# Patient Record
Sex: FEMALE | Race: WHITE | Employment: FULL TIME | URBAN - METROPOLITAN AREA
[De-identification: names, ages, dates, MRNs, and addresses within clinical notes are randomized per-mention and may not be internally consistent; named-entity substitution may affect disease eponyms.]

---

## 2017-12-05 ENCOUNTER — TELEPHONE (OUTPATIENT)
Dept: SURGERY | Age: 37
End: 2017-12-05

## 2017-12-07 ENCOUNTER — OFFICE VISIT (OUTPATIENT)
Dept: SURGERY | Age: 37
End: 2017-12-07

## 2017-12-07 ENCOUNTER — DOCUMENTATION ONLY (OUTPATIENT)
Dept: SURGERY | Age: 37
End: 2017-12-07

## 2017-12-07 VITALS
HEIGHT: 67 IN | WEIGHT: 114 LBS | HEART RATE: 64 BPM | DIASTOLIC BLOOD PRESSURE: 76 MMHG | RESPIRATION RATE: 20 BRPM | BODY MASS INDEX: 17.89 KG/M2 | TEMPERATURE: 98.1 F | SYSTOLIC BLOOD PRESSURE: 112 MMHG

## 2017-12-07 DIAGNOSIS — E04.1 THYROID NODULE: Primary | ICD-10-CM

## 2017-12-07 NOTE — LETTER
12/11/2017 4:05 PM 
 
Patient:  Ivana Kumar YOB: 1980 Date of Visit: 12/7/2017 Angie Buckley MD 
[address] [facility] [address] [address] VIA In Basket Nisha Hernandez MD 
[address] [facility] [phone] [id number] VIA Facsimile: [phone] Lisa Correa MD 
[facility] [address] [id number] VIA Facsimile: [phone] Dear MD Nisha Vazquez MD Sherrin Mulders, MD, Thank you for [facility] and [facility]. Below are the relevant portions of my assessment and plan of care. Hernia Evaluation Subjective:  
 
Thomas Flynn is a 44 y.o. female with a history of open trocar site hernia repair by me in 2013. She complains of new epigastric and generalized discomfort for the last year. She has had no nausea and no vomiting. The discomfort is dull and deep but seems to be related to new bloating. In addition, she had a finding of a new relatively asymptomatic thyroid nodule. She has had no work-up of this. She also has a new 5.5 cm ovarian cyst with elevated Ca-125. She is to see Dr. Calvin Zavala first for this cyst next week. There are no active problems to display for this patient. Past Medical History:  
Diagnosis Date  Eczema  Endometriosis  Moderate precancerous changes of the cervix  Nausea & vomiting   
 severe nausea  Polycystic disease, ovaries  Unspecified adverse effect of anesthesia   
 post op pain, severe Past Surgical History:  
Procedure Laterality Date  BREAST SURGERY PROCEDURE UNLISTED  2010 Breast bx left breast  
 HX BREAST LUMPECTOMY  2010  HX GYN Robotic BSO, myomectomy  HX LAPAROTOMY  HX OTHER SURGICAL  2004 Isolated Lymphadenectomy of neck for benign enlargement Social History Substance Use Topics  Smoking status: Never Smoker  Smokeless tobacco: Never Used  Alcohol use Yes Comment: occ Family History Problem Relation Age of Onset  Cancer Mother Breast CA  Post-op Nausea/Vomiting Mother  Cancer Maternal Aunt Breast CA  Cancer Maternal Grandmother Breast CA  
 Osteoporosis Father  Lung Disease Father  COPD Father No current outpatient prescriptions on file. No current facility-administered medications for this visit. Allergies Allergen Reactions  Latex Other (comments) Irritates skin Review of Systems:  Positive in BOLD 
 
CONST: Fever, weight loss, fatigue or chills GI: Nausea, vomiting, abdominal pain, change in bowel habits, hematochezia, melena, and GERD, bloating INTEG: Dermatitis, abnormal moles HEENT: Recent changes in vision, vertigo, epistaxis, dysphagia and hoarseness, globus CV: Chest pain, palpitations, HTN, edema and varicosities RESP: Cough, shortness of breath, wheezing, hemoptysis, snoring and reactive airway disease : Hematuria, dysuria, frequency, urgency, nocturia and stress urinary incontinence MS: Weakness, joint pain and arthritis ENDO: Diabetes, thyroid disease, polyuria, polydipsia, polyphagia, poor wound healing, heat intolerance, cold intolerance LYMPH/HEME: Anemia, bruising and history of blood transfusions NEURO: Dizziness, headache, fainting, seizures and stroke PSYCH: Anxiety and depression Objective:  
 
Visit Vitals  /76 (BP 1 Location: Left arm, BP Patient Position: At rest)  Pulse 64  Temp 98.1 °F (36.7 °C) (Oral)  Resp 20  
 Ht 5' 7\" (1.702 m)  Wt 51.7 kg (114 lb)  BMI 17.85 kg/m2 Physical Exam:   
 
GENERAL: alert, cooperative, no distress, appears stated age EYE:conjunctivae and sclerae normal, pupils equal, round, reactive to light, extraocular movements intact without nystagmus THROAT & NECK: 3 cm left thyroid nodule, mobile but mildly tender to exam 
LUNG: clear to auscultation bilaterally HEART: Regular rate and rhythm ABDOMEN: Well healed epigastric hernia repair. Edges of the mesh are palpable due to habitus. There is no recurrent hernia nor evidence of complication EXTREMITIES:  extremities normal, atraumatic, no cyanosis or edema SKIN: Normal. 
 
Imaging and Lab Review: none Assessment: 1. No hernia recurrence 2. Thyroid nodule - Patient has significant symptoms and wishes to proceed with surgical intervention. Plan: 1. Reassured re: hernia site 2. Obtain TFTs today and thyroid US 3. Assuming euthyroid and single nodule will obtain FNA in radiology and return after. Signed By: Rebekah Starr MD   
 December 7, 2017 Thank you very much for your referral of Ms. Valerie Alvarez. If you have questions, please do not hesitate to call me. I look forward to following Ms. Figueredo along with you and will keep you updated as to her progress.   
 
 
 
 
Sincerely, 
 
 
Rebekah Starr MD

## 2017-12-07 NOTE — PROGRESS NOTES
Bee Swanson is a 44 y.o. female who presents today with   Chief Complaint   Patient presents with    Thyroid Problem     Pt presents today to f/u of hernia repair 2013 and thyroid nodule. 1. Have you been to the ER, urgent care clinic since your last visit? Hospitalized since your last visit? No    2. Have you seen or consulted any other health care providers outside of the [address] [place] [address] since your last visit? Include any pap smears or colon screening.  No

## 2017-12-07 NOTE — PROGRESS NOTES
Hernia Evaluation      Subjective:     Aletha Ferreira is a 44 y.o. female with a history of open trocar site hernia repair by me in 2013. She complains of new epigastric and generalized discomfort for the last year. She has had no nausea and no vomiting. The discomfort is dull and deep but seems to be related to new bloating. In addition, she had a finding of a new relatively asymptomatic thyroid nodule. She has had no work-up of this. She also has a new 5.5 cm ovarian cyst with elevated Ca-125. She is to see Dr. Epifanio Parkinson first for this cyst next week. There are no active problems to display for this patient. Past Medical History:   Diagnosis Date    Eczema     Endometriosis     Moderate precancerous changes of the cervix     Nausea & vomiting     severe nausea    Polycystic disease, ovaries     Unspecified adverse effect of anesthesia     post op pain, severe      Past Surgical History:   Procedure Laterality Date    BREAST SURGERY PROCEDURE UNLISTED  2010    Breast bx left breast    HX BREAST LUMPECTOMY  2010    HX GYN      Robotic BSO, myomectomy    HX LAPAROTOMY      HX OTHER SURGICAL  2004    Isolated Lymphadenectomy of neck for benign enlargement      Social History   Substance Use Topics    Smoking status: Never Smoker    Smokeless tobacco: Never Used    Alcohol use Yes      Comment: occ      Family History   Problem Relation Age of Onset    Cancer Mother      Breast CA    Post-op Nausea/Vomiting Mother     Cancer Maternal Aunt      Breast CA    Cancer Maternal Grandmother      Breast CA    Osteoporosis Father     Lung Disease Father     COPD Father       No current outpatient prescriptions on file. No current facility-administered medications for this visit.        Allergies   Allergen Reactions    Latex Other (comments)     Irritates skin        Review of Systems:  Positive in BOLD    CONST: Fever, weight loss, fatigue or chills  GI: Nausea, vomiting, abdominal pain, change in bowel habits, hematochezia, melena, and GERD, bloating   INTEG: Dermatitis, abnormal moles  HEENT: Recent changes in vision, vertigo, epistaxis, dysphagia and hoarseness, globus  CV: Chest pain, palpitations, HTN, edema and varicosities  RESP: Cough, shortness of breath, wheezing, hemoptysis, snoring and reactive airway disease  : Hematuria, dysuria, frequency, urgency, nocturia and stress urinary incontinence   MS: Weakness, joint pain and arthritis  ENDO: Diabetes, thyroid disease, polyuria, polydipsia, polyphagia, poor wound healing, heat intolerance, cold intolerance  LYMPH/HEME: Anemia, bruising and history of blood transfusions  NEURO: Dizziness, headache, fainting, seizures and stroke  PSYCH: Anxiety and depression    Objective:     Visit Vitals    /76 (BP 1 Location: Left arm, BP Patient Position: At rest)    Pulse 64    Temp 98.1 °F (36.7 °C) (Oral)    Resp 20    Ht 5' 7\" (1.702 m)    Wt 51.7 kg (114 lb)    BMI 17.85 kg/m2       Physical Exam:      GENERAL: alert, cooperative, no distress, appears stated age  EYE:conjunctivae and sclerae normal, pupils equal, round, reactive to light, extraocular movements intact without nystagmus  THROAT & NECK: 3 cm left thyroid nodule, mobile but mildly tender to exam  LUNG: clear to auscultation bilaterally  HEART: Regular rate and rhythm  ABDOMEN: Well healed epigastric hernia repair. Edges of the mesh are palpable due to habitus. There is no recurrent hernia nor evidence of complication  EXTREMITIES:  extremities normal, atraumatic, no cyanosis or edema  SKIN: Normal.    Imaging and Lab Review: none    Assessment:   1. No hernia recurrence  2. Thyroid nodule -   Patient has significant symptoms and wishes to proceed with surgical intervention. Plan:       1. Reassured re: hernia site  2. Obtain TFTs today and thyroid US  3. Assuming euthyroid and single nodule will obtain FNA in radiology and return after.      Signed By: Codey Knight MD December 7, 2017

## 2017-12-08 ENCOUNTER — HOSPITAL ENCOUNTER (OUTPATIENT)
Dept: LAB | Age: 37
Discharge: HOME | End: 2017-12-08
Attending: SURGERY
Payer: COMMERCIAL

## 2017-12-08 ENCOUNTER — HOSPITAL ENCOUNTER (OUTPATIENT)
Dept: ULTRASOUND IMAGING | Age: 37
Discharge: HOME | End: 2017-12-08
Attending: SURGERY
Payer: COMMERCIAL

## 2017-12-08 DIAGNOSIS — E04.1 THYROID NODULE: ICD-10-CM

## 2017-12-08 LAB — T4 FREE SERPL-MCNC: 1.1 NG/DL (ref 0.7–1.5)

## 2017-12-08 PROCEDURE — 76536 US EXAM OF HEAD AND NECK: CPT

## 2017-12-08 PROCEDURE — 84439 ASSAY OF FREE THYROXINE: CPT | Performed by: SURGERY

## 2017-12-08 PROCEDURE — 36415 COLL VENOUS BLD VENIPUNCTURE: CPT | Performed by: SURGERY

## 2017-12-11 DIAGNOSIS — E04.1 THYROID NODULE: Primary | ICD-10-CM

## 2017-12-11 NOTE — COMMUNICATION BODY
Hernia Evaluation      Subjective:     Caroline Spence is a 44 y.o. female with a history of open trocar site hernia repair by me in 2013. She complains of new epigastric and generalized discomfort for the last year. She has had no nausea and no vomiting. The discomfort is dull and deep but seems to be related to new bloating. In addition, she had a finding of a new relatively asymptomatic thyroid nodule. She has had no work-up of this. She also has a new 5.5 cm ovarian cyst with elevated Ca-125. She is to see Dr. Lieutenant Brantley first for this cyst next week. There are no active problems to display for this patient. Past Medical History:   Diagnosis Date    Eczema     Endometriosis     Moderate precancerous changes of the cervix     Nausea & vomiting     severe nausea    Polycystic disease, ovaries     Unspecified adverse effect of anesthesia     post op pain, severe      Past Surgical History:   Procedure Laterality Date    BREAST SURGERY PROCEDURE UNLISTED  2010    Breast bx left breast    HX BREAST LUMPECTOMY  2010    HX GYN      Robotic BSO, myomectomy    HX LAPAROTOMY      HX OTHER SURGICAL  2004    Isolated Lymphadenectomy of neck for benign enlargement      Social History   Substance Use Topics    Smoking status: Never Smoker    Smokeless tobacco: Never Used    Alcohol use Yes      Comment: occ      Family History   Problem Relation Age of Onset    Cancer Mother      Breast CA    Post-op Nausea/Vomiting Mother     Cancer Maternal Aunt      Breast CA    Cancer Maternal Grandmother      Breast CA    Osteoporosis Father     Lung Disease Father     COPD Father       No current outpatient prescriptions on file. No current facility-administered medications for this visit.        Allergies   Allergen Reactions    Latex Other (comments)     Irritates skin        Review of Systems:  Positive in BOLD    CONST: Fever, weight loss, fatigue or chills  GI: Nausea, vomiting, abdominal pain, change in bowel habits, hematochezia, melena, and GERD, bloating   INTEG: Dermatitis, abnormal moles  HEENT: Recent changes in vision, vertigo, epistaxis, dysphagia and hoarseness, globus  CV: Chest pain, palpitations, HTN, edema and varicosities  RESP: Cough, shortness of breath, wheezing, hemoptysis, snoring and reactive airway disease  : Hematuria, dysuria, frequency, urgency, nocturia and stress urinary incontinence   MS: Weakness, joint pain and arthritis  ENDO: Diabetes, thyroid disease, polyuria, polydipsia, polyphagia, poor wound healing, heat intolerance, cold intolerance  LYMPH/HEME: Anemia, bruising and history of blood transfusions  NEURO: Dizziness, headache, fainting, seizures and stroke  PSYCH: Anxiety and depression    Objective:     Visit Vitals    /76 (BP 1 Location: Left arm, BP Patient Position: At rest)    Pulse 64    Temp 98.1 °F (36.7 °C) (Oral)    Resp 20    Ht 5' 7\" (1.702 m)    Wt 51.7 kg (114 lb)    BMI 17.85 kg/m2       Physical Exam:      GENERAL: alert, cooperative, no distress, appears stated age  EYE:conjunctivae and sclerae normal, pupils equal, round, reactive to light, extraocular movements intact without nystagmus  THROAT & NECK: 3 cm left thyroid nodule, mobile but mildly tender to exam  LUNG: clear to auscultation bilaterally  HEART: Regular rate and rhythm  ABDOMEN: Well healed epigastric hernia repair. Edges of the mesh are palpable due to habitus. There is no recurrent hernia nor evidence of complication  EXTREMITIES:  extremities normal, atraumatic, no cyanosis or edema  SKIN: Normal.    Imaging and Lab Review: none    Assessment:   1. No hernia recurrence  2. Thyroid nodule -   Patient has significant symptoms and wishes to proceed with surgical intervention. Plan:       1. Reassured re: hernia site  2. Obtain TFTs today and thyroid US  3. Assuming euthyroid and single nodule will obtain FNA in radiology and return after.      Signed By: Jenifer Harden MD December 7, 2017

## 2017-12-22 ENCOUNTER — HOSPITAL ENCOUNTER (OUTPATIENT)
Dept: ULTRASOUND IMAGING | Age: 37
Discharge: HOME | End: 2017-12-22
Attending: SURGERY
Payer: COMMERCIAL

## 2017-12-22 DIAGNOSIS — E04.1 THYROID NODULE: ICD-10-CM

## 2017-12-22 PROCEDURE — 88177 CYTP FNA EVAL EA ADDL: CPT | Performed by: SURGERY

## 2017-12-22 PROCEDURE — 88305 TISSUE EXAM BY PATHOLOGIST: CPT | Performed by: SURGERY

## 2017-12-22 PROCEDURE — 88172 CYTP DX EVAL FNA 1ST EA SITE: CPT | Performed by: SURGERY

## 2017-12-22 PROCEDURE — 10022 US GUIDE FINE NDL ASP THYROID: CPT

## 2017-12-22 PROCEDURE — 88173 CYTOPATH EVAL FNA REPORT: CPT | Performed by: SURGERY

## 2017-12-22 PROCEDURE — 74011000250 HC RX REV CODE- 250: Performed by: RADIOLOGY

## 2017-12-22 RX ORDER — LIDOCAINE HYDROCHLORIDE 10 MG/ML
9 INJECTION INFILTRATION; PERINEURAL
Status: COMPLETED | OUTPATIENT
Start: 2017-12-22 | End: 2017-12-22

## 2017-12-22 RX ADMIN — LIDOCAINE HYDROCHLORIDE 9 ML: 10 INJECTION, SOLUTION INFILTRATION; PERINEURAL at 10:55

## 2017-12-27 ENCOUNTER — TELEPHONE (OUTPATIENT)
Dept: SURGERY | Age: 37
End: 2017-12-27

## 2017-12-27 DIAGNOSIS — E04.1 THYROID NODULE: Primary | ICD-10-CM

## 2017-12-27 NOTE — TELEPHONE ENCOUNTER
Patient called the office looking for results of recent FNAB. Dr Kae Aquino is out of town at this time. I have informed Ms. Figueredo that the biopsy result is nondiagnostic. I discussed results with Dr Tigist Eric who advises that patient should undergo repeat FNA. Discussed with patient who wishes to know if she should just have her thyroid removed at the same time as her upcoming oophorectomy on 1/9/2018 for an ovarian mass. I have advised her that we should schedule a repeat FNAB and depending upon the results of that, surgical treatment vs observation may be selected. Patient understands and agrees with this course of action.

## 2017-12-27 NOTE — TELEPHONE ENCOUNTER
Pt called requesting results for FNA thyroid done 12/22/2017. Tiger texted on call Dr Margareth Pat in regards reviewing results on patients. I was told by assistant in the [place] per Dr. Margareth Pat  that the results were indeterminate and the patient is to follow up next week with Dr. Singh Lawton when he returns from [facility] [place] [address].

## 2017-12-28 ENCOUNTER — TELEPHONE (OUTPATIENT)
Dept: SURGERY | Age: 37
End: 2017-12-28

## 2017-12-28 DIAGNOSIS — E04.1 THYROID NODULE: Primary | ICD-10-CM

## 2017-12-28 NOTE — TELEPHONE ENCOUNTER
Called patient to see if she had gotten her phone call in regards to her FNA being scheduled as [place] had called me in regards to her having it on 12/29/17

## 2017-12-29 ENCOUNTER — HOSPITAL ENCOUNTER (OUTPATIENT)
Dept: ULTRASOUND IMAGING | Age: 37
Discharge: HOME | End: 2017-12-29
Attending: SURGERY
Payer: COMMERCIAL

## 2017-12-29 DIAGNOSIS — E04.1 THYROID NODULE: ICD-10-CM

## 2017-12-29 PROCEDURE — 88173 CYTOPATH EVAL FNA REPORT: CPT | Performed by: SURGERY

## 2017-12-29 PROCEDURE — 74011000250 HC RX REV CODE- 250: Performed by: RADIOLOGY

## 2017-12-29 PROCEDURE — 88333 PATH CONSLTJ SURG CYTO XM 1: CPT | Performed by: SURGERY

## 2017-12-29 PROCEDURE — 88305 TISSUE EXAM BY PATHOLOGIST: CPT | Performed by: SURGERY

## 2017-12-29 PROCEDURE — 88177 CYTP FNA EVAL EA ADDL: CPT | Performed by: SURGERY

## 2017-12-29 PROCEDURE — 88172 CYTP DX EVAL FNA 1ST EA SITE: CPT | Performed by: SURGERY

## 2017-12-29 PROCEDURE — 77030003503 US GUIDE FINE NDL ASP THYROID

## 2017-12-29 RX ORDER — LIDOCAINE HYDROCHLORIDE 10 MG/ML
9 INJECTION INFILTRATION; PERINEURAL
Status: COMPLETED | OUTPATIENT
Start: 2017-12-29 | End: 2017-12-29

## 2017-12-29 RX ADMIN — LIDOCAINE HYDROCHLORIDE 9 ML: 10 INJECTION, SOLUTION INFILTRATION; PERINEURAL at 13:35

## 2018-01-02 ENCOUNTER — HOSPITAL ENCOUNTER (OUTPATIENT)
Dept: PREADMISSION TESTING | Age: 38
Discharge: HOME | End: 2018-01-02
Payer: COMMERCIAL

## 2018-01-02 ENCOUNTER — TELEPHONE (OUTPATIENT)
Dept: SURGERY | Age: 38
End: 2018-01-02

## 2018-01-02 DIAGNOSIS — R19.00 PELVIC MASS: ICD-10-CM

## 2018-01-02 LAB
ABO + RH BLD: NORMAL
ANION GAP SERPL CALC-SCNC: 7 MMOL/L (ref 3–18)
BASOPHILS # BLD: 0 K/UL (ref 0–0.06)
BASOPHILS NFR BLD: 1 % (ref 0–2)
BLOOD GROUP ANTIBODIES SERPL: NORMAL
BUN SERPL-MCNC: 5 MG/DL (ref 7–18)
BUN/CREAT SERPL: 8 (ref 12–20)
CALCIUM SERPL-MCNC: 8.6 MG/DL (ref 8.5–10.1)
CHLORIDE SERPL-SCNC: 105 MMOL/L (ref 100–108)
CO2 SERPL-SCNC: 29 MMOL/L (ref 21–32)
CREAT SERPL-MCNC: 0.59 MG/DL (ref 0.6–1.3)
DIFFERENTIAL METHOD BLD: ABNORMAL
EOSINOPHIL # BLD: 0.1 K/UL (ref 0–0.4)
EOSINOPHIL NFR BLD: 1 % (ref 0–5)
ERYTHROCYTE [DISTWIDTH] IN BLOOD BY AUTOMATED COUNT: 11.8 % (ref 11.6–14.5)
GLUCOSE SERPL-MCNC: 74 MG/DL (ref 74–99)
HCG SERPL-ACNC: <1 MIU/ML (ref 0–10)
HCT VFR BLD AUTO: 37.1 % (ref 35–45)
HGB BLD-MCNC: 12.4 G/DL (ref 12–16)
LYMPHOCYTES # BLD: 1.2 K/UL (ref 0.9–3.6)
LYMPHOCYTES NFR BLD: 31 % (ref 21–52)
MCH RBC QN AUTO: 30.9 PG (ref 24–34)
MCHC RBC AUTO-ENTMCNC: 33.4 G/DL (ref 31–37)
MCV RBC AUTO: 92.5 FL (ref 74–97)
MONOCYTES # BLD: 0.4 K/UL (ref 0.05–1.2)
MONOCYTES NFR BLD: 10 % (ref 3–10)
NEUTS SEG # BLD: 2.1 K/UL (ref 1.8–8)
NEUTS SEG NFR BLD: 57 % (ref 40–73)
PLATELET # BLD AUTO: 200 K/UL (ref 135–420)
PMV BLD AUTO: 10.9 FL (ref 9.2–11.8)
POTASSIUM SERPL-SCNC: 3.8 MMOL/L (ref 3.5–5.5)
RBC # BLD AUTO: 4.01 M/UL (ref 4.2–5.3)
SODIUM SERPL-SCNC: 141 MMOL/L (ref 136–145)
SPECIMEN EXP DATE BLD: NORMAL
WBC # BLD AUTO: 3.8 K/UL (ref 4.6–13.2)

## 2018-01-02 PROCEDURE — 85025 COMPLETE CBC W/AUTO DIFF WBC: CPT | Performed by: OBSTETRICS & GYNECOLOGY

## 2018-01-02 PROCEDURE — 84702 CHORIONIC GONADOTROPIN TEST: CPT | Performed by: OBSTETRICS & GYNECOLOGY

## 2018-01-02 PROCEDURE — 36415 COLL VENOUS BLD VENIPUNCTURE: CPT | Performed by: OBSTETRICS & GYNECOLOGY

## 2018-01-02 PROCEDURE — 80048 BASIC METABOLIC PNL TOTAL CA: CPT | Performed by: OBSTETRICS & GYNECOLOGY

## 2018-01-02 PROCEDURE — 86900 BLOOD TYPING SEROLOGIC ABO: CPT | Performed by: OBSTETRICS & GYNECOLOGY

## 2018-01-02 RX ORDER — THERA TABS 400 MCG
1 TAB ORAL DAILY
COMMUNITY
End: 2018-01-18 | Stop reason: ALTCHOICE

## 2018-01-02 NOTE — PERIOP NOTES
PAT - SURGICAL PRE-ADMISSION INSTRUCTIONS    NAME:  Aiden Gutiérrez                                                          TODAY'S DATE:  1/2/2018    SURGERY DATE:  1/9/2018                                  SURGERY ARRIVAL TIME:   0800    1. Do NOT eat or drink anything, including candy or gum, after midnight on 01/08/18 , unless you have specific instructions from your Surgeon or Anesthesia Provider to do so. 2. No smoking on the day of surgery. 3. No alcohol 24 hours prior to the day of surgery. 4. No recreational drugs for one week prior to the day of surgery. 5. Leave all valuables, including money/purse, at home. 6. Remove all jewelry, nail polish, makeup (including mascara); no lotions, powders, deodorant, or perfume/cologne/after shave. 7. Glasses/Contact lenses and Dentures may be worn to the hospital.  They will be removed prior to surgery. 8. Call your doctor if symptoms of a cold or illness develop within 24 ours prior to surgery. 9. AN ADULT MUST DRIVE YOU HOME AFTER OUTPATIENT SURGERY. 10. If you are having an OUTPATIENT procedure, please make arrangements for a responsible adult to be with you for 24 hours after your surgery. 11. If you are admitted to the hospital, you will be assigned to a bed after surgery is complete. Normally a family member will not be able to see you until you are in your assigned bed. 15. Family is encouraged to accompany you to the hospital.  We ask visitors in the treatment area to be limited to ONE person at a time to ensure patient privacy. EXCEPTIONS WILL BE MADE AS NEEDED. 15. Children under 12 are discouraged from entering the treatment area and need to be supervised by an adult when in the waiting room. Special Instructions:    Complete bowel prep per MD instructions. Patient Prep:    use CHG solution    These surgical instructions were reviewed with Lindsey Arteaga during the PAT visit.    A printed copy of the instructions was provided to Teja Ramos. Directions: On the morning of surgery, please go to the [facility]. Enter the building from the [facility] entrance, 1st floor (next to the Emergency Room entrance). Take the elevator to the 2nd floor. Sign in at the Registration Desk.     If you have any questions and/or concerns, please do not hesitate to call:  (Prior to the day of surgery)  [facility] unit:  [phone]  (Day of surgery)  [facility] unit:  [phone]

## 2018-01-02 NOTE — TELEPHONE ENCOUNTER
Sol Trinidad 1980 called, saying she is having a hysterectomy with Dr. Vincenzo Baez on 1/09/2018. The Surgery in system is scheduled for  1/23/2018  . She has already called the lab directly in regards to her FNA results and knows they are not back yet. She still would like to schedule a partial  thyroidectomy proactively. She said that if she does not need to do the surgery based on the results then she would just have cancel the thyroidectomy. She has called the office multiple times to set up procedures and requesting results. I told her it may best for her to schedule an appointment with Dr. Dougie Mccallum this Thursday 1/4/2018 to discuss results, concerns and discuss surgical options in person. She then declined the appointment stating she would rather continue to call the office to discuss and set up her care.

## 2018-01-04 ENCOUNTER — TELEPHONE (OUTPATIENT)
Dept: SURGERY | Age: 38
End: 2018-01-04

## 2018-01-04 NOTE — TELEPHONE ENCOUNTER
LM explaining will attempt to discuss planned surgery tomorrow and that trying to get final FNA results which are due to arrive today before that discussion. Explained will call again tomorrow or sooner if results are available.

## 2018-01-05 ENCOUNTER — TELEPHONE (OUTPATIENT)
Dept: SURGERY | Age: 38
End: 2018-01-05

## 2018-01-05 DIAGNOSIS — E04.1 THYROID NODULE: ICD-10-CM

## 2018-01-05 DIAGNOSIS — E04.1 THYROID NODULE: Primary | ICD-10-CM

## 2018-01-05 NOTE — H&P
Dear Dr. Antoinette Lugo: Your patient, Tam Koch, was seen at your request in gynecologic oncology consultation today for evaluation and management of left ovarian cyst and elevated CA-125. Lan Alberts Chief Complaint:  Increased    Diagnosis:  Bilateral ovarian cysts- benign    Treatment:  08/30/12, robotically assisted bilateral ovarian cystectomy and myomectomy and fulguration of endometriosis in which final pathology revealed no evidence of malignancy. Diagnostic Studies:    Interval History:  Ms. Albert Mayfield is a 44year old with a history of benign ovarian cysts, fibroids and endometriosis for which she underwent robotic assisted bilateral ovarian cystectomy, myomectomy and fulguration of endometriosis. She presented as a new patient on 11/29/2017 for GYN with c/o abdominal and pelvic pains which has persisted for 4 weeks. An interpretation of an U/S performed on 11/29/2017 finds a left ovarian cyst measuring 5.5 cm which is worrisome appearing. The uterus and right ovary are noted as normal. No free fluid was seen in the cul de sac. A  was drawn, result is 285. Her pain has continued in the LLQ. She has regular menses. She has an adopted daughter and does not desire fertility. Past Medical History:  Hernia involving central trocar site- repaired 3/2013  Bilateral endometriomas  Endometriosis  Infertility  Pelvic adhesive disease. Past Surgical History:  08/30/12, robotically assisted bilateral ovarian cystectomy and myomectomy and fulguration of endometriosis in which final pathology revealed no evidence of malignancy. Open epigastric incisional  hernia repair with 4.3 cm diameter mesh    Health Maintenance:  Screenings: Mammogram - Screening (bilateral) on 2012; Pap Smear on 11/29/2017; Pelvic Exam on 11/29/2017  Immunizations: Not given  Never smoker    Review of Systems:  Constitutional: No weight loss, no fever, no chills, night sweats, fatigue.   Cardiovascular:no chest pain, no palpitations, no syncope, no upper extremity edema, no lower extremity edema, no calf discomfort. Respiratory:  no cough, no hemoptysis, no dyspnea, no pleurisy, no wheezing. Breast:  no breast mass, no pain, no nipple discharge, no change in size, no change in shape. Gastrointestinal:  abdominal pain, bloating, no nausea, no vomiting, constipation, no hematochezia, no jaundice, no abdominal cramping, no hematemesis, no diarrhea, no melena, no dyspepsia, no dysphagia. Female urinary:  no dysuria, no hematuria, nocturia, no urinary incontinence. Gynecological:  no vaginal discharge, no suprapubic pain, no abnormal vaginal bleeding. Musculoskeletal: no muscle pain, no swollen joints, no joint redness, no bone pain, no spine tenderness. Neurologic:  no confusion, no seizures, no syncope, no tremor, no speech change, no headache, no hiccups, no abnormal gait, no weakness, no sensory changes. Psychiatric:  no depression, no anxiety, concentration normal.  Endocrine:  no polyuria, no polydipsia, no hot flashes. Hematologic:  no epistaxis, no gingival bleeding, no petechiae, no ecchymosis. Lymphatic:  no lymphadenopathy, no lymphedema. Ob/Gyn History:  Pregnancy Details: Desire for Fertility: No; #Living Children: 1; ; Menopause Information: Premenopausal; ; OB/GYN Medication Hx: IUD: No; Other Contraceptive Hx: No; ; OB/GYN Comments: adopted 1 child    Family History: Mother:  - Breast cancer; Grandmother - Maternal (2nd Degree): Breast cancer; Aunt - Maternal (2nd Degree): Breast cancer    Social History:       Alcohol use:   Recreational drug use: none      Vital Signs:  Vital signs:  HT: 66.5 in, WT: 114.8 lb,  Blood pressure: 112/68, Pulse: 73, Temperature: 98.5 F, Respirations: 12, O2 sat: 97%, Pain Scale: 4, Height: 66.5 in, Weight: 114.8 lb, BSA: 1.56, BMI: 18.25 kg/m2, BMI: 18.25 kg/m2    Physical Exam:  Constitutional:  normal appearance, no acute distress.   Eyes:  conjunctivae normal, eyelids normal, PERRLA, anicteric. Ears: nose, throat:  external ears and nose normal, hearing grossly normal, oropharynx unremarkable. Neck: 1cm smooth round nodule on the left thyroid; supple. Respiratory:  breathing comfortably, lungs clear to auscultation and percussion. Cardiovascular:  normal heart sounds, heart rhythm regular, no peripheral edema. Left breast:  normal appearance, no breast mass, no tenderness. Right breast:  normal appearance, no breast mass, no tenderness. Abdomen: central, LUQ and suprapubic tenderness; no ascites, no masses, no hepatomegaly, no splenomegaly. Pelvic:  external genitalia unremarkable. Vagina and cervix normal.  Uterus is normal size and reasonably mobile; 5-6cm left adnexal mass with reasonable mobility. No nodularity. Accompanied by:  Female staff member. Rectal:  Exam is limited. Anal sphincter tone is normal.  No rectal lesions are noted. Lymph nodes:  no cervical adenopathy, no axillary adenopathy, no supraclavicular adenopathy. Musculoskeletal:  normal muscle strength. Neurologic:  no focal motor deficit, normal gait, no abnormal mental status. Psychiatric:  oriented to person, time and place, mood and affect appropriate to situation, appropriate judgement and insight, memory intact. Assessment:  Left adnexal mass with elevated CA-125 of 285. Family history of breast cancer      Plan:  I discussed the current situation in detail with Ms. Lawrence. I recommended that we make plans to go to the operating room for minimally invasive robotic left oophorectomy, bilateral salpingectomy, possible TLH BSO, staging. The details, risks, and postoperative recovery were reviewed. Her questions were answered to her apparent satisfaction. The patient did not want to schedule surgery and today's visit and will contact the office to schedule. CT of the abdomen and pelvis will be ordered pre-operatively.     We have again discussed the recommendations for genetic counseling/testing. Thank you for the opportunity to share in the care of this pleasant patient. Sincerely,    Laboratory:                             Current Medications: Allergies & Adverse Reactions:  No known medication allergies         Problem List:  Disease (disorder)  Mass of pelvic structure (finding)    New Orders:            12/11/2017, CT abdomen/pelvis w/ contrast, Instructions: Pt prefers [address], Perform Date: STAT, Instructions: Pt prefers [address]                  Other Physicians: MD Lorenza Pete M.D. Documentation provided by Rene Long, for Dr. Sabrina Mosqueda, 12/11/17. Send copy of note to:   Audelia Cool MD         Electronically signed by Lorenza Olsen MD 12/13/2017 08:32 EST

## 2018-01-06 ENCOUNTER — HOSPITAL ENCOUNTER (OUTPATIENT)
Dept: ULTRASOUND IMAGING | Age: 38
Discharge: HOME | End: 2018-01-06
Payer: COMMERCIAL

## 2018-01-06 DIAGNOSIS — E04.1 THYROID NODULE: ICD-10-CM

## 2018-01-06 PROCEDURE — 76882 US LMTD JT/FCL EVL NVASC XTR: CPT

## 2018-01-08 ENCOUNTER — ANESTHESIA EVENT (OUTPATIENT)
Dept: SURGERY | Age: 38
End: 2018-01-08
Payer: COMMERCIAL

## 2018-01-08 ENCOUNTER — TELEPHONE (OUTPATIENT)
Dept: SURGERY | Age: 38
End: 2018-01-08

## 2018-01-09 ENCOUNTER — HOSPITAL ENCOUNTER (OUTPATIENT)
Age: 38
Setting detail: OBSERVATION
Discharge: HOME | End: 2018-01-11
Attending: OBSTETRICS & GYNECOLOGY | Admitting: OBSTETRICS & GYNECOLOGY
Payer: COMMERCIAL

## 2018-01-09 ENCOUNTER — ANESTHESIA (OUTPATIENT)
Dept: SURGERY | Age: 38
End: 2018-01-09
Payer: COMMERCIAL

## 2018-01-09 DIAGNOSIS — C56.2 OVARIAN CANCER ON LEFT (HCC): ICD-10-CM

## 2018-01-09 DIAGNOSIS — Z90.710 S/P LAPAROSCOPIC HYSTERECTOMY: Primary | ICD-10-CM

## 2018-01-09 LAB — HCG UR QL: NEGATIVE

## 2018-01-09 PROCEDURE — 77030009848 HC PASSR SUT SET COOP -C: Performed by: OBSTETRICS & GYNECOLOGY

## 2018-01-09 PROCEDURE — 76010000880 HC OR TIME 4 TO 4.5HR INTENSV - TIER 2: Performed by: OBSTETRICS & GYNECOLOGY

## 2018-01-09 PROCEDURE — 77030003029 HC SUT VCRL J&J -B: Performed by: OBSTETRICS & GYNECOLOGY

## 2018-01-09 PROCEDURE — 77030003028 HC SUT VCRL J&J -A: Performed by: OBSTETRICS & GYNECOLOGY

## 2018-01-09 PROCEDURE — 77030010938 HC CLP LIG TELE -A: Performed by: OBSTETRICS & GYNECOLOGY

## 2018-01-09 PROCEDURE — 74011250636 HC RX REV CODE- 250/636: Performed by: STUDENT IN AN ORGANIZED HEALTH CARE EDUCATION/TRAINING PROGRAM

## 2018-01-09 PROCEDURE — 74011250636 HC RX REV CODE- 250/636: Performed by: NURSE ANESTHETIST, CERTIFIED REGISTERED

## 2018-01-09 PROCEDURE — 81025 URINE PREGNANCY TEST: CPT

## 2018-01-09 PROCEDURE — 77030010507 HC ADH SKN DERMBND J&J -B: Performed by: OBSTETRICS & GYNECOLOGY

## 2018-01-09 PROCEDURE — 74011250636 HC RX REV CODE- 250/636

## 2018-01-09 PROCEDURE — 77030020255 HC SOL INJ LR 1000ML BG

## 2018-01-09 PROCEDURE — 77030020703 HC SEAL CANN DISP INTU -B: Performed by: OBSTETRICS & GYNECOLOGY

## 2018-01-09 PROCEDURE — 77030011640 HC PAD GRND REM COVD -A: Performed by: OBSTETRICS & GYNECOLOGY

## 2018-01-09 PROCEDURE — 99218 HC RM OBSERVATION: CPT

## 2018-01-09 PROCEDURE — 74011250636 HC RX REV CODE- 250/636: Performed by: OBSTETRICS & GYNECOLOGY

## 2018-01-09 PROCEDURE — 88307 TISSUE EXAM BY PATHOLOGIST: CPT | Performed by: OBSTETRICS & GYNECOLOGY

## 2018-01-09 PROCEDURE — 74011250637 HC RX REV CODE- 250/637: Performed by: NURSE ANESTHETIST, CERTIFIED REGISTERED

## 2018-01-09 PROCEDURE — 77030011267 HC ELECTRD BLD COVD -A: Performed by: OBSTETRICS & GYNECOLOGY

## 2018-01-09 PROCEDURE — 88331 PATH CONSLTJ SURG 1 BLK 1SPC: CPT | Performed by: OBSTETRICS & GYNECOLOGY

## 2018-01-09 PROCEDURE — 76210000006 HC OR PH I REC 0.5 TO 1 HR: Performed by: OBSTETRICS & GYNECOLOGY

## 2018-01-09 PROCEDURE — 74011000250 HC RX REV CODE- 250: Performed by: OBSTETRICS & GYNECOLOGY

## 2018-01-09 PROCEDURE — 88304 TISSUE EXAM BY PATHOLOGIST: CPT | Performed by: OBSTETRICS & GYNECOLOGY

## 2018-01-09 PROCEDURE — 77030005538 HC CATH URETH FOL44 BARD -B: Performed by: OBSTETRICS & GYNECOLOGY

## 2018-01-09 PROCEDURE — 77030019656 HC PRB STIM NRV MEDT -C: Performed by: OBSTETRICS & GYNECOLOGY

## 2018-01-09 PROCEDURE — 74011250637 HC RX REV CODE- 250/637: Performed by: ANESTHESIOLOGY

## 2018-01-09 PROCEDURE — 77030032490 HC SLV COMPR SCD KNE COVD -B: Performed by: OBSTETRICS & GYNECOLOGY

## 2018-01-09 PROCEDURE — 77030002996 HC SUT SLK J&J -A: Performed by: OBSTETRICS & GYNECOLOGY

## 2018-01-09 PROCEDURE — 77030032988 HC TU ET NIM TRIVNTG EMG MEDT -D: Performed by: ANESTHESIOLOGY

## 2018-01-09 PROCEDURE — 77030031139 HC SUT VCRL2 J&J -A: Performed by: OBSTETRICS & GYNECOLOGY

## 2018-01-09 PROCEDURE — 77030002933 HC SUT MCRYL J&J -A: Performed by: OBSTETRICS & GYNECOLOGY

## 2018-01-09 PROCEDURE — 77030016151 HC PROTCTR LNS DFOG COVD -B: Performed by: OBSTETRICS & GYNECOLOGY

## 2018-01-09 PROCEDURE — 74011000250 HC RX REV CODE- 250: Performed by: SURGERY

## 2018-01-09 PROCEDURE — 77030020059 HC SYR ANGI CNTRL MRTM -A: Performed by: OBSTETRICS & GYNECOLOGY

## 2018-01-09 PROCEDURE — 88305 TISSUE EXAM BY PATHOLOGIST: CPT | Performed by: OBSTETRICS & GYNECOLOGY

## 2018-01-09 PROCEDURE — 77030010516 HC APPL HEMA CLP TELE -B: Performed by: OBSTETRICS & GYNECOLOGY

## 2018-01-09 PROCEDURE — 74011000250 HC RX REV CODE- 250

## 2018-01-09 PROCEDURE — 76060000039 HC ANESTHESIA 4 TO 4.5 HR: Performed by: OBSTETRICS & GYNECOLOGY

## 2018-01-09 PROCEDURE — 74011000258 HC RX REV CODE- 258

## 2018-01-09 PROCEDURE — 77030035277 HC OBTRTR BLDELSS DISP INTU -B: Performed by: OBSTETRICS & GYNECOLOGY

## 2018-01-09 PROCEDURE — 77030018842 HC SOL IRR SOD CL 9% BAXT -A: Performed by: OBSTETRICS & GYNECOLOGY

## 2018-01-09 PROCEDURE — 77030013079 HC BLNKT BAIR HGGR 3M -A: Performed by: ANESTHESIOLOGY

## 2018-01-09 RX ORDER — OXYCODONE AND ACETAMINOPHEN 5; 325 MG/1; MG/1
1 TABLET ORAL AS NEEDED
Status: DISCONTINUED | OUTPATIENT
Start: 2018-01-09 | End: 2018-01-09 | Stop reason: HOSPADM

## 2018-01-09 RX ORDER — FENTANYL CITRATE 50 UG/ML
25 INJECTION, SOLUTION INTRAMUSCULAR; INTRAVENOUS
Status: DISCONTINUED | OUTPATIENT
Start: 2018-01-09 | End: 2018-01-09 | Stop reason: HOSPADM

## 2018-01-09 RX ORDER — ONDANSETRON 2 MG/ML
4 INJECTION INTRAMUSCULAR; INTRAVENOUS
Status: DISCONTINUED | OUTPATIENT
Start: 2018-01-09 | End: 2018-01-09 | Stop reason: HOSPADM

## 2018-01-09 RX ORDER — FAMOTIDINE 20 MG/1
20 TABLET, FILM COATED ORAL ONCE
Status: COMPLETED | OUTPATIENT
Start: 2018-01-09 | End: 2018-01-09

## 2018-01-09 RX ORDER — BUPIVACAINE HYDROCHLORIDE 5 MG/ML
INJECTION, SOLUTION EPIDURAL; INTRACAUDAL AS NEEDED
Status: DISCONTINUED | OUTPATIENT
Start: 2018-01-09 | End: 2018-01-09 | Stop reason: HOSPADM

## 2018-01-09 RX ORDER — SODIUM CHLORIDE, SODIUM LACTATE, POTASSIUM CHLORIDE, CALCIUM CHLORIDE 600; 310; 30; 20 MG/100ML; MG/100ML; MG/100ML; MG/100ML
25 INJECTION, SOLUTION INTRAVENOUS CONTINUOUS
Status: DISCONTINUED | OUTPATIENT
Start: 2018-01-09 | End: 2018-01-09

## 2018-01-09 RX ORDER — ACETAMINOPHEN 325 MG/1
650 TABLET ORAL
Status: DISCONTINUED | OUTPATIENT
Start: 2018-01-09 | End: 2018-01-09

## 2018-01-09 RX ORDER — HYDROMORPHONE HYDROCHLORIDE 1 MG/ML
1 INJECTION, SOLUTION INTRAMUSCULAR; INTRAVENOUS; SUBCUTANEOUS
Status: DISCONTINUED | OUTPATIENT
Start: 2018-01-09 | End: 2018-01-09 | Stop reason: CLARIF

## 2018-01-09 RX ORDER — PROPOFOL 10 MG/ML
INJECTION, EMULSION INTRAVENOUS AS NEEDED
Status: DISCONTINUED | OUTPATIENT
Start: 2018-01-09 | End: 2018-01-09 | Stop reason: HOSPADM

## 2018-01-09 RX ORDER — ZOLPIDEM TARTRATE 5 MG/1
5 TABLET ORAL
Status: DISCONTINUED | OUTPATIENT
Start: 2018-01-09 | End: 2018-01-11 | Stop reason: HOSPADM

## 2018-01-09 RX ORDER — SODIUM CHLORIDE 0.9 % (FLUSH) 0.9 %
5-10 SYRINGE (ML) INJECTION EVERY 8 HOURS
Status: DISCONTINUED | OUTPATIENT
Start: 2018-01-09 | End: 2018-01-10

## 2018-01-09 RX ORDER — BUPIVACAINE HYDROCHLORIDE AND EPINEPHRINE 5; 5 MG/ML; UG/ML
50 INJECTION, SOLUTION EPIDURAL; INTRACAUDAL; PERINEURAL ONCE
Status: DISCONTINUED | OUTPATIENT
Start: 2018-01-09 | End: 2018-01-09 | Stop reason: CLARIF

## 2018-01-09 RX ORDER — DIPHENHYDRAMINE HYDROCHLORIDE 50 MG/ML
25 INJECTION, SOLUTION INTRAMUSCULAR; INTRAVENOUS
Status: DISCONTINUED | OUTPATIENT
Start: 2018-01-09 | End: 2018-01-09 | Stop reason: HOSPADM

## 2018-01-09 RX ORDER — ESTRADIOL 0.1 MG/D
1 FILM, EXTENDED RELEASE TRANSDERMAL
Qty: 8 PATCH | Refills: 11 | Status: SHIPPED | OUTPATIENT
Start: 2018-01-11

## 2018-01-09 RX ORDER — CEFOTETAN AND DEXTROSE 2 G/50ML
2 INJECTION, SOLUTION INTRAVENOUS ONCE
Status: COMPLETED | OUTPATIENT
Start: 2018-01-09 | End: 2018-01-09

## 2018-01-09 RX ORDER — HYDROMORPHONE HYDROCHLORIDE 2 MG/ML
0.5 INJECTION, SOLUTION INTRAMUSCULAR; INTRAVENOUS; SUBCUTANEOUS
Status: DISCONTINUED | OUTPATIENT
Start: 2018-01-09 | End: 2018-01-09 | Stop reason: HOSPADM

## 2018-01-09 RX ORDER — ESTRADIOL 0.1 MG/D
1 PATCH TRANSDERMAL
Status: DISCONTINUED | OUTPATIENT
Start: 2018-01-09 | End: 2018-01-09 | Stop reason: HOSPADM

## 2018-01-09 RX ORDER — FENTANYL CITRATE 50 UG/ML
INJECTION, SOLUTION INTRAMUSCULAR; INTRAVENOUS AS NEEDED
Status: DISCONTINUED | OUTPATIENT
Start: 2018-01-09 | End: 2018-01-09 | Stop reason: HOSPADM

## 2018-01-09 RX ORDER — LIDOCAINE HYDROCHLORIDE 10 MG/ML
0.1 INJECTION INFILTRATION; PERINEURAL AS NEEDED
Status: DISCONTINUED | OUTPATIENT
Start: 2018-01-09 | End: 2018-01-09

## 2018-01-09 RX ORDER — SCOLOPAMINE TRANSDERMAL SYSTEM 1 MG/1
1 PATCH, EXTENDED RELEASE TRANSDERMAL ONCE
Status: COMPLETED | OUTPATIENT
Start: 2018-01-09 | End: 2018-01-10

## 2018-01-09 RX ORDER — SODIUM CHLORIDE 0.9 % (FLUSH) 0.9 %
5-10 SYRINGE (ML) INJECTION AS NEEDED
Status: DISCONTINUED | OUTPATIENT
Start: 2018-01-09 | End: 2018-01-11 | Stop reason: HOSPADM

## 2018-01-09 RX ORDER — CEFAZOLIN SODIUM 1 G/3ML
INJECTION, POWDER, FOR SOLUTION INTRAMUSCULAR; INTRAVENOUS AS NEEDED
Status: DISCONTINUED | OUTPATIENT
Start: 2018-01-09 | End: 2018-01-09 | Stop reason: HOSPADM

## 2018-01-09 RX ORDER — OXYCODONE AND ACETAMINOPHEN 5; 325 MG/1; MG/1
2 TABLET ORAL
Status: DISCONTINUED | OUTPATIENT
Start: 2018-01-09 | End: 2018-01-10

## 2018-01-09 RX ORDER — NALOXONE HYDROCHLORIDE 0.4 MG/ML
0.4 INJECTION, SOLUTION INTRAMUSCULAR; INTRAVENOUS; SUBCUTANEOUS AS NEEDED
Status: DISCONTINUED | OUTPATIENT
Start: 2018-01-09 | End: 2018-01-11 | Stop reason: HOSPADM

## 2018-01-09 RX ORDER — DIPHENHYDRAMINE HCL 25 MG
25 CAPSULE ORAL
Status: DISCONTINUED | OUTPATIENT
Start: 2018-01-09 | End: 2018-01-09

## 2018-01-09 RX ORDER — ONDANSETRON 2 MG/ML
INJECTION INTRAMUSCULAR; INTRAVENOUS AS NEEDED
Status: DISCONTINUED | OUTPATIENT
Start: 2018-01-09 | End: 2018-01-09 | Stop reason: HOSPADM

## 2018-01-09 RX ORDER — HYDROMORPHONE HYDROCHLORIDE 2 MG/1
2 TABLET ORAL
Qty: 20 TAB | Refills: 0 | Status: SHIPPED | OUTPATIENT
Start: 2018-01-09 | End: 2018-01-18 | Stop reason: ALTCHOICE

## 2018-01-09 RX ORDER — LIDOCAINE HYDROCHLORIDE 20 MG/ML
INJECTION, SOLUTION EPIDURAL; INFILTRATION; INTRACAUDAL; PERINEURAL AS NEEDED
Status: DISCONTINUED | OUTPATIENT
Start: 2018-01-09 | End: 2018-01-09 | Stop reason: HOSPADM

## 2018-01-09 RX ORDER — ONDANSETRON 8 MG/1
8 TABLET, ORALLY DISINTEGRATING ORAL
Qty: 15 TAB | Refills: 1 | Status: SHIPPED | OUTPATIENT
Start: 2018-01-09 | End: 2018-01-18 | Stop reason: ALTCHOICE

## 2018-01-09 RX ORDER — SODIUM CHLORIDE, SODIUM LACTATE, POTASSIUM CHLORIDE, CALCIUM CHLORIDE 600; 310; 30; 20 MG/100ML; MG/100ML; MG/100ML; MG/100ML
50 INJECTION, SOLUTION INTRAVENOUS CONTINUOUS
Status: DISCONTINUED | OUTPATIENT
Start: 2018-01-09 | End: 2018-01-09 | Stop reason: HOSPADM

## 2018-01-09 RX ORDER — SUCCINYLCHOLINE CHLORIDE 20 MG/ML
INJECTION INTRAMUSCULAR; INTRAVENOUS AS NEEDED
Status: DISCONTINUED | OUTPATIENT
Start: 2018-01-09 | End: 2018-01-09 | Stop reason: HOSPADM

## 2018-01-09 RX ORDER — HYDROMORPHONE HYDROCHLORIDE 2 MG/ML
1 INJECTION, SOLUTION INTRAMUSCULAR; INTRAVENOUS; SUBCUTANEOUS
Status: DISCONTINUED | OUTPATIENT
Start: 2018-01-09 | End: 2018-01-11 | Stop reason: HOSPADM

## 2018-01-09 RX ORDER — ONDANSETRON 2 MG/ML
4 INJECTION INTRAMUSCULAR; INTRAVENOUS
Status: DISCONTINUED | OUTPATIENT
Start: 2018-01-09 | End: 2018-01-10

## 2018-01-09 RX ORDER — MIDAZOLAM HYDROCHLORIDE 1 MG/ML
INJECTION, SOLUTION INTRAMUSCULAR; INTRAVENOUS AS NEEDED
Status: DISCONTINUED | OUTPATIENT
Start: 2018-01-09 | End: 2018-01-09 | Stop reason: HOSPADM

## 2018-01-09 RX ORDER — BUPIVACAINE HYDROCHLORIDE 5 MG/ML
INJECTION, SOLUTION PERINEURAL AS NEEDED
Status: DISCONTINUED | OUTPATIENT
Start: 2018-01-09 | End: 2018-01-09 | Stop reason: HOSPADM

## 2018-01-09 RX ORDER — VECURONIUM BROMIDE FOR INJECTION 1 MG/ML
INJECTION, POWDER, LYOPHILIZED, FOR SOLUTION INTRAVENOUS AS NEEDED
Status: DISCONTINUED | OUTPATIENT
Start: 2018-01-09 | End: 2018-01-09 | Stop reason: HOSPADM

## 2018-01-09 RX ORDER — ENOXAPARIN SODIUM 100 MG/ML
40 INJECTION SUBCUTANEOUS EVERY 24 HOURS
Status: DISCONTINUED | OUTPATIENT
Start: 2018-01-10 | End: 2018-01-10

## 2018-01-09 RX ADMIN — ONDANSETRON 4 MG: 2 INJECTION INTRAMUSCULAR; INTRAVENOUS at 17:45

## 2018-01-09 RX ADMIN — MIDAZOLAM HYDROCHLORIDE 2 MG: 1 INJECTION, SOLUTION INTRAMUSCULAR; INTRAVENOUS at 13:27

## 2018-01-09 RX ADMIN — CEFOTETAN DISODIUM 2 G: 2 INJECTION, POWDER, FOR SOLUTION INTRAMUSCULAR; INTRAVENOUS at 13:37

## 2018-01-09 RX ADMIN — FAMOTIDINE 20 MG: 20 TABLET, FILM COATED ORAL at 13:01

## 2018-01-09 RX ADMIN — VECURONIUM BROMIDE FOR INJECTION 4 MG: 1 INJECTION, POWDER, LYOPHILIZED, FOR SOLUTION INTRAVENOUS at 13:43

## 2018-01-09 RX ADMIN — CEFAZOLIN SODIUM 2 G: 1 INJECTION, POWDER, FOR SOLUTION INTRAMUSCULAR; INTRAVENOUS at 16:08

## 2018-01-09 RX ADMIN — FENTANYL CITRATE 100 MCG: 50 INJECTION, SOLUTION INTRAMUSCULAR; INTRAVENOUS at 13:35

## 2018-01-09 RX ADMIN — LIDOCAINE HYDROCHLORIDE 60 MG: 20 INJECTION, SOLUTION EPIDURAL; INFILTRATION; INTRACAUDAL; PERINEURAL at 13:35

## 2018-01-09 RX ADMIN — HYDROMORPHONE HYDROCHLORIDE 1 MG: 2 INJECTION INTRAMUSCULAR; INTRAVENOUS; SUBCUTANEOUS at 19:20

## 2018-01-09 RX ADMIN — Medication 10 ML: at 17:52

## 2018-01-09 RX ADMIN — SODIUM CHLORIDE, SODIUM LACTATE, POTASSIUM CHLORIDE, AND CALCIUM CHLORIDE 50 ML/HR: 600; 310; 30; 20 INJECTION, SOLUTION INTRAVENOUS at 17:51

## 2018-01-09 RX ADMIN — SODIUM CHLORIDE, SODIUM LACTATE, POTASSIUM CHLORIDE, AND CALCIUM CHLORIDE 25 ML/HR: 600; 310; 30; 20 INJECTION, SOLUTION INTRAVENOUS at 13:12

## 2018-01-09 RX ADMIN — PROPOFOL 140 MG: 10 INJECTION, EMULSION INTRAVENOUS at 13:35

## 2018-01-09 RX ADMIN — Medication 10 ML: at 21:40

## 2018-01-09 RX ADMIN — ONDANSETRON 4 MG: 2 INJECTION INTRAMUSCULAR; INTRAVENOUS at 21:55

## 2018-01-09 RX ADMIN — FENTANYL CITRATE 50 MCG: 50 INJECTION, SOLUTION INTRAMUSCULAR; INTRAVENOUS at 13:58

## 2018-01-09 RX ADMIN — FENTANYL CITRATE 50 MCG: 50 INJECTION, SOLUTION INTRAMUSCULAR; INTRAVENOUS at 15:41

## 2018-01-09 RX ADMIN — SUCCINYLCHOLINE CHLORIDE 100 MG: 20 INJECTION INTRAMUSCULAR; INTRAVENOUS at 13:35

## 2018-01-09 NOTE — PERIOP NOTES
Called out to family waiting. Spoke with pt sister, Toby Shrestha. Updated on progress of procedure and pt status.

## 2018-01-09 NOTE — IP AVS SNAPSHOT
[address] Patient: Avis Salvador MRN: [id number] :1980 About your hospitalization You were admitted on:  2018 You last received care in the:  [address] [facility] You were discharged on:  2018 Why you were hospitalized Your primary diagnosis was:  Ovarian Cancer On Left (Hcc) Your diagnoses also included:  S/P Laparoscopic Hysterectomy Follow-up Information Follow up With Details Comments Contact Info Isabelle Riggs MD   [address] [facility] [address] [facility] [address] 
[phone] Yolanda Wong MD In 1 week for follow up [address] [facility] [address]  [facility] [address] [address] [phone] Vera Simmonds, MD  as scheduled [address] [facility] [address] [phone] Discharge Orders None A check annmarie indicates which time of day the medication should be taken. My Medications START taking these medications Instructions Each Dose to Equal  
 Morning Noon Evening Bedtime  
 estradiol 0.1 mg/24 hr  
Commonly known as:  VIVELLE-DOT Your last dose was: Your next dose is:    
   
   
 1 Patch by TransDERmal route two (2) days a week. 1 Patch HYDROmorphone 2 mg tablet Commonly known as:  DILAUDID Your last dose was: Your next dose is: Take 1 Tab by mouth every four (4) hours as needed for Pain. Max Daily Amount: 12 mg.  
 2 mg  
    
   
   
   
  
 ondansetron 8 mg disintegrating tablet Commonly known as:  ZOFRAN ODT Your last dose was: Your next dose is: Take 1 Tab by mouth every eight (8) hours as needed for Nausea. 8 mg  
    
   
   
   
  
 promethazine 25 mg tablet Commonly known as:  PHENERGAN Your last dose was: Your next dose is: Take 1 Tab by mouth every six (6) hours as needed for Nausea for up to 30 days. 25 mg  
    
   
   
   
  
 scopolamine 1 mg Pt3d Commonly known as:  TRANSDERM-SCOP Start taking on:  1/13/2018 Your last dose was: Your next dose is:    
   
   
 1 Patch by TransDERmal route every seventy-two (72) hours for 30 days. 1 Patch CONTINUE taking these medications Instructions Each Dose to Equal  
 Morning Noon Evening Bedtime  
 therapeutic multivitamin tablet Commonly known as:  [facility] Your last dose was: Your next dose is: Take 1 Tab by mouth daily. 1 Tab Where to Get Your Medications Information on where to get these meds will be given to you by the nurse or doctor. ! Ask your nurse or doctor about these medications  
  estradiol 0.1 mg/24 hr  
 HYDROmorphone 2 mg tablet  
 ondansetron 8 mg disintegrating tablet  
 promethazine 25 mg tablet  
 scopolamine 1 mg Pt3d Discharge Instructions DISCHARGE SUMMARY from Nurse PATIENT INSTRUCTIONS: 
 
 
F-face looks uneven A-arms unable to move or move unevenly S-speech slurred or non-existent T-time-call 911 as soon as signs and symptoms begin-DO NOT go Back to bed or wait to see if you get better-TIME IS BRAIN. Warning Signs of HEART ATTACK Call 911 if you have these symptoms: 
? Chest discomfort.  Most heart attacks involve discomfort in the center of the chest that lasts more than a few minutes, or that goes away and comes back. It can feel like uncomfortable pressure, squeezing, fullness, or pain. ? Discomfort in other areas of the upper body. Symptoms can include pain or discomfort in one or both arms, the back, neck, jaw, or stomach. ? Shortness of breath with or without chest discomfort. ? Other signs may include breaking out in a cold sweat, nausea, or lightheadedness. Don't wait more than five minutes to call [address]! Fast action can save your life. Calling 911 is almost always the fastest way to get lifesaving treatment. Emergency Medical Services staff can begin treatment when they arrive  up to an hour sooner than if someone gets to the hospital by car. The discharge information has been reviewed with the patient. The patient verbalized understanding. Discharge medications reviewed with the patient and appropriate educational materials and side effects teaching were provided. Patient armband removed and shredded. ___________________________________________________________________________________________________________________________________ Introducing [facility]! Dear Errol Mclean: Thank you for requesting a Babyage account. Our records indicate that you already have an active Babyage account. You can access your account anytime at [email or web address] Did you know that you can access your hospital and ER discharge instructions at any time in Babyage? You can also review all of your test results from your hospital stay or ER visit. Additional Information If you have questions, please visit the Frequently Asked Questions section of the Babyage website at [email or web address] Remember, Babyage is NOT to be used for urgent needs. For medical emergencies, dial 911. Now available from your iPhone and Android! Providers Seen During Your Hospitalization Provider Specialty Primary office phone Aaron Guerrero MD Gynecologic Oncology [phone] Your Primary Care Physician (PCP) Primary Care Physician Office Phone Office Fax Mili Barreto [phone] You are allergic to the following Allergen Reactions Latex Contact Dermatitis Irritates skin Doryx (Doxycycline Hyclate) Hives Sulfa (Sulfonamide Antibiotics) Hives Recent Documentation Height Weight BMI OB Status Smoking Status 1.702 m 50.3 kg 17.39 kg/m2 Having regular periods Never Smoker Emergency Contacts Name Discharge Info Relation Home Work Mobile  
 von [address] CAREGIVER [3] Sister [23]   [phone] Pasquale Arteaga DISCHARGE CAREGIVER [3] Friend [5]   [phone] Genesis Yoo DISCHARGE CAREGIVER [3] Friend [5]   [phone] Ayde Infante DISCHARGE CAREGIVER [3] Friend [5]   [phone] Patient Belongings The following personal items are in your possession at time of discharge: 
  Dental Appliances: None  Visual Aid: None Discharge Instructions Attachments/References THYROID SURGERY: POST-OP (ENGLISH) LAPAROSCOPIC HYSTERECTOMY: POST-OP (ENGLISH) HYDROMORPHONE (BY MOUTH) (ENGLISH) ONDANSETRON (BY MOUTH, INTO THE MOUTH) (ENGLISH) ESTRADIOL (ABSORBED THROUGH THE SKIN) (ENGLISH) SCOPOLAMINE (ABSORBED THROUGH THE SKIN) (ENGLISH) Patient Handouts Thyroid Surgery: What to Expect at [facility] Your Recovery Your doctor made a cut (incision) in your neck and removed part of your thyroid gland to find what is causing a lump or to remove a growth in the gland. The piece removed may have been large or small. Your doctor may have removed all of your thyroid if there was cancer or another problem. He or she did a test on a small sample of the tissue from your thyroid and closed the incision in your neck with stitches. Keep the incision covered with the bandage and dry for 48 hours. A small amount of bleeding can be expected. Ask your doctor how much drainage to expect. You may go home on the same day or stay one or more nights in the hospital after surgery. You may be able to return to work or your normal routine in 1 to 2 weeks. This depends on whether you need more treatment, how you feel, and the kind of work you do. Your doctor will check your incision about a week after surgery. You may need to take thyroid medicine. If you have thyroid cancer, you may need to have radioactive iodine therapy. Your doctor will talk to you about what happens next. You will feel some pain for several days. You may have some nausea and general muscle aches and may feel tired for 1 to 2 days. You also may have a sore throat and sound hoarse. This care sheet gives you a general idea about how long it will take for you to recover. But each person recovers at a different pace. Follow the steps below to feel better as quickly as possible. How can you care for yourself at home? Activity ? · Rest when you feel tired. Getting enough sleep will help you recover. ? · Most people are able to return to work a few days after surgery, but this can depend on what type of work you do. Diet ? · You can eat your normal diet. If your stomach is upset, try bland, low-fat foods like plain rice, broiled chicken, toast, and yogurt. Medicines ? · Your doctor will tell you if and when you can restart your medicines. He or she will also give you instructions about taking any new medicines. ? · If you take blood thinners, such as warfarin (Coumadin), clopidogrel (Plavix), or aspirin, be sure to talk to your doctor. He or she will tell you if and when to start taking those medicines again. Make sure that you understand exactly what your doctor wants you to do. ? · Suck on throat lozenges or gargle with warm salt water to help your sore throat. ? · Be safe with medicines. Take pain medicines exactly as directed. ¨ If the doctor gave you a prescription medicine for pain, take it as prescribed. ¨ If you are not taking a prescription pain medicine, ask your doctor if you can take an over-the-counter medicine. ? · If you think your pain medicine is making you sick to your stomach: 
¨ Take your medicine after meals (unless your doctor has told you not to). ¨ Ask your doctor for a different pain medicine. Incision care ? · If you have strips of tape on the cut (incision) the doctor made, leave the tape on for a week or until it falls off. Or follow your doctor's instructions for removing the tape. ? · Keep the area clean and dry. ? · You will have a dressing over the cut (incision). A dressing helps the incision heal and protects it. Your doctor will tell you how to take care of this. Exercise ? · Avoid strenuous activities, such as bicycle riding, jogging, weight lifting, or aerobic exercise, until your doctor says it is okay. ? Elevation ? · You may be more comfortable if you keep your head up on a pillow when you lie down. Support the back of your head and neck with both hands when you sit up to prevent discomfort. Follow-up care is a key part of your treatment and safety. Be sure to make and go to all appointments, and call your doctor if you are having problems. It's also a good idea to know your test results and keep a list of the medicines you take. When should you call for help? Call 911 anytime you think you may need emergency care. For example, call if: 
? · You have severe trouble breathing. ?Call your doctor now or seek immediate medical care if: 
? · You have a lot of bleeding through the bandage. ? · You have a hard time swallowing. ? · You have trouble breathing. ? · You have new or worsening pain. ? · You have symptoms of infection, such as: 
¨ Increased pain, swelling, warmth, or redness. ¨ Red streaks leading from the incision. ¨ Pus draining from the incision. ¨ A fever. ? Watch closely for any changes in your health, and be sure to contact your doctor if: 
? · You're not getting better as expected. ? · You notice a change in your voice. Where can you learn more? Go to http://james-kerry.info/. Enter V203 in the search box to learn more about \"Thyroid Surgery: What to Expect at Home. \" Current as of: May 12, 2017 Content Version: 11.4 © 4993-7492 [employer]. Care instructions adapted under license by [employer] (which disclaims liability or warranty for this information). If you have questions about a medical condition or this instruction, always ask your healthcare professional. [address] any warranty or liability for your use of this information. Laparoscopic Hysterectomy: What to Expect at [facility] Your Recovery A hysterectomy is surgery to take out the uterus. In somecases, the ovaries and fallopian tubes also are taken out at thesame time. You can expect to feel better and stronger each day, but you may need pain medicine for a week or two. You may get tired easily or have less energy than usual. The tiredness may last for several weeks after surgery. You will probably notice that your belly is swollen and puffy. This is common. The swelling will take several weeks to go down. You may take about 4 to 6 weeks to fully recover. It is important to avoid lifting while you are recovering so that you can heal. 
This care sheet gives you a general idea about how long it will take for you to recover. But each person recovers at a different pace. Follow the steps below to get better as quickly as possible. How can you care for yourself at home? Activity ? · Rest when you feel tired. ? · Be active. Walking is a good choice.   
? · Allow the area to heal. Don't move quickly or lift anything heavy until you are feeling better. ? · You may shower 24 to 48 hours after surgery, if your doctor okays it. Pat the incision dry. Do not take a bath for the first 2 weeks, or until your doctor tells you it is okay. ? · Ask your doctor when it is okay for you to have sex. Diet ? · You can eat your normal diet. If your stomach is upset, try bland, low-fat foods like plain rice, broiled chicken, toast, and yogurt. ? · If your bowel movements are not regular right after surgery, try to avoid constipation and straining. Drink plenty of water. Your doctor may suggest fiber, a stool softener, or a mild laxative. Medicines ? · Your doctor will tell you if and when you can restart your medicines. He or she will also give you instructions about taking any new medicines. ? · If you take blood thinners, such as warfarin (Coumadin), clopidogrel (Plavix), or aspirin, be sure to talk to your doctor. He or she will tell you if and when to start taking those medicines again. Make sure that you understand exactly what your doctor wants you to do. ? · Be safe with medicines. Read and follow all instructions on the label. ¨ If the doctor gave you a prescription medicine for pain, take it as prescribed. ¨ If you are not taking a prescription pain medicine, ask your doctor if you can take an over-the-counter medicine. ? · If your doctor prescribed antibiotics, take them as directed. Do not stop taking them just because you feel better. You need to take the full course of antibiotics. Incision care ? · You may have stitches over the cuts (incisions) the doctor made in your belly. ? · If you have strips of tape on the cut (incision) the doctor made, leave the tape on for a week or until it falls off.  
? · Wash the area daily with warm, soapy water, and pat it dry. Don't use hydrogen peroxide or alcohol. They can slow healing.   
? · You may cover the area with a gauze bandage if it oozes fluid or rubs against clothing. ? · Change the bandage every day. Other instructions ? · You may have some light vaginal bleeding. Wear sanitary pads if needed. Do not douche or use tampons. ·  
? · Don't have sex until the doctor says it is okay. Follow-up care is a key part of your treatment and safety. Be sure to make and go to all appointments, and call your doctor if you are having problems. It's also a good idea to know your test results and keep a list of the medicines you take. When should you call for help? Call 911 anytime you think you may need emergency care. For example, call if: 
? · You passed out (lost consciousness). ? · You have chest pain, are short of breath, or cough up blood. ?Call your doctor now or seek immediate medical care if: 
? · You have pain that does not get better after you take pain medicine. ? · You cannot pass stoolsl or gas. ? · You have vaginal discharge that has increased in amount or smells bad.  
? · You are sick to your stomach or cannot drink fluids. ? · You have loose stitches, or your incision comes open. ? · Bright red blood has soaked through the bandage over your incision. ? · You have signs of infection, such as: 
¨ Increased pain, swelling, warmth, or redness. ¨ Red streaks leading from the incision. ¨ Pus draining from the incision. ¨ A fever. ? · You have bright red vaginal bleeding that soaks one or more pads in an hour, or you have large clots. ? · You have signs of a blood clot in your leg (called a deep vein thrombosis), such as: 
¨ Pain in your calf, back of the knee, thigh, or groin. ¨ Redness and swelling in your leg or groin. ? Watch closely for changes in your health, and be sure to contact your doctor if you have any problems. Where can you learn more? Go to http://james-kerry.info/. Enter Q131 in the search box to learn more about \"Laparoscopic Hysterectomy: What to Expect at Home. \" 
 Current as of: October 13, 2016 Content Version: 11.4 © 4927-7816 [employer]. Care instructions adapted under license by [employer] (which disclaims liability or warranty for this information). If you have questions about a medical condition or this instruction, always ask your healthcare professional. [address] any warranty or liability for your use of this information. Hydromorphone (By mouth) Hydromorphone (wps-rbwp-OGG-fone) Treats moderate to severe pain. This medicine is a narcotic pain reliever. Brand Name(s): Dilaudid, Exalgo There may be other brand names for this medicine. When This Medicine Should Not Be Used: This medicine is not right for everyone. Do not use it if you had an allergic reaction to hydromorphone or sulfites, or if you have severe lung or breathing problems, or stomach or bowel blockage (including paralytic ileus). How to Use This Medicine:  
Long Acting Capsule, Liquid, Tablet, Long Acting Tablet · Take your medicine as directed. Your dose may need to be changed several times to find what works best for you. An overdose can be dangerous. Follow directions carefully so you do not get too much medicine at one time. · Oral liquid: Measure the oral liquid medicine with a marked measuring spoon, oral syringe, or medicine cup. If you get any of the oral liquid on your skin, rinse it with cool water right away. · Swallow the extended-release capsule whole. Do not crush, break, or chew it. · Swallow the extended-release tablet whole. Do not crush, break, or chew it. · If you take the extended-release tablet, part of the tablet may pass into your stools. This is normal and is nothing to worry about. · Hydromorphone extended-release capsules or tablets work differently than regular hydromorphone tablets, even at the same dose. Do not switch from one form to another unless your doctor tells you to. · This medicine should come with a Medication Guide. Ask your pharmacist for a copy if you do not have one. · Missed dose:  
¨ Extended-release capsules or tablets: If you miss a dose, skip the missed dose and take your next dose at the usual time the next day. Do not double doses. ¨ Oral liquid: Take a dose as soon as you remember. If it is almost time for your next dose, wait until then and take a regular dose. Do not take extra medicine to make up for a missed dose. · Store the medicine in a closed container at room temperature, away from heat, moisture, and direct light. Store the medicine in a safe and secure place. Do not throw unused medicine in the trash. Ask your pharmacist about the best way to dispose of medicine you do not use. Drugs and Foods to Avoid: Ask your doctor or pharmacist before using any other medicine, including over-the-counter medicines, vitamins, and herbal products. · Do not use this medicine if you are using or have used an MAO inhibitor within the past 14 days. · Some medicines can affect how hydromorphone works. Tell your doctor if you are using any of the following: ¨ Blood pressure medicine ¨ Diuretic (water pill) ¨ Medicine to treat depression ¨ Phenothiazine medicine · Do not drink alcohol while you are using this medicine. · Tell your doctor if you use anything else that makes you sleepy. Some examples are allergy medicine, narcotic pain medicine, and alcohol. Tell your doctor if you are also using buprenorphine, butorphanol, nalbuphine, pentazocine, or a muscle relaxer. Warnings While Using This Medicine: · Tell your doctor if you are pregnant or breastfeeding, or if you have kidney disease, liver disease, lung disease or breathing problems (such as asthma or COPD), an underactive thyroid, adrenal problems, cystic fibrosis, pancreas problems, gallbladder problems, an enlarged prostate, trouble urinating, or stomach or bowel problems.  Tell your doctor if you have a history of head injury, brain tumor, seizures, depression, or alcohol or drug abuse. · This medicine may cause the following problems: 
¨ High risk of overdose, which can lead to death ¨ Respiratory depression (serious breathing problem that can be life-threatening) ¨ Serotonin syndrome, when used with certain medicines · This medicine can be habit-forming. Do not use more than your prescribed dose. Call your doctor if you think your medicine is not working. · Do not stop using this medicine suddenly. Your doctor will need to slowly decrease your dose before you stop it completely. · This medicine may make you dizzy, drowsy, or lightheaded. Do not drive or do anything else that could be dangerous until you know how this medicine affects you. Sit or lie down if you feel dizzy. Stand up carefully. · This medicine could cause infertility. Talk with your doctor before using this medicine if you plan to have children. · This medicine may cause constipation, especially with long-term use. Ask your doctor if you should use a laxative to prevent and treat constipation. · Keep all medicine out of the reach of children. Never share your medicine with anyone. Possible Side Effects While Using This Medicine:  
Call your doctor right away if you notice any of these side effects: · Allergic reaction: Itching or hives, swelling in your face or hands, swelling or tingling in your mouth or throat, chest tightness, trouble breathing · Anxiety, restlessness, fast heartbeat, fever, sweating, muscle spasms, twitching, nausea, vomiting, diarrhea, seeing or hearing things that are not there · Blue lips, fingernails, or skin · Extreme dizziness or weakness, shallow breathing, slow or uneven heartbeat, sweating, cold or clammy skin, seizures · Severe confusion, lightheadedness, dizziness, fainting · Severe constipation, stomach pain, or vomiting · Trouble breathing or slow breathing If you notice these less serious side effects, talk with your doctor: · Mild constipation, nausea, or vomiting · Mild sleepiness or tiredness If you notice other side effects that you think are caused by this medicine, tell your doctor. Call your doctor for medical advice about side effects. You may report side effects to FDA at 3-280-KSV-5126 © 2017 [facility] Information is for End User's use only and may not be sold, redistributed or otherwise used for commercial purposes. The above information is an  only. It is not intended as medical advice for individual conditions or treatments. Talk to your doctor, nurse or pharmacist before following any medical regimen to see if it is safe and effective for you. Ondansetron (By mouth, Into the mouth) Ondansetron (on-DAN-se-fuentes) Prevents nausea and vomiting. Brand Name(s): Zofran, Zofran ODT, Feli Balo There may be other brand names for this medicine. When This Medicine Should Not Be Used: This medicine is not right for everyone. Do not use it if you had an allergic reaction to ondansetron. How to Use This Medicine: Thin Sheet, Liquid, Tablet, Dissolving Tablet · Your doctor will tell you how much medicine to use. Do not use more than directed. · Measure the oral liquid medicine with a marked measuring spoon, oral syringe, or medicine cup. · To use the disintegrating tablet:  
¨ Do not open the blister pack that contains the tablet until you are ready to take it. ¨ Make sure your hands are dry. Peel back the foil, then remove the tablet from the blister pack. Do not push the tablet through the foil. ¨ Place the tablet on top of your tongue where it will dissolve in seconds. After the tablet has melted, swallow or take a sip of water. · To use the soluble film: ¨ Make sure your hands are clean and dry. ¨ Fold the pouch along the dotted line. ¨ While still folded, tear the pouch carefully along the edge. Remove the film from the pouch. ¨ Place the film on top of your tongue. It will dissolve in 4 to 20 seconds. Do not chew or swallow the film whole. ¨ After the film has dissolved, you may swallow with or without water. · Read and follow the patient instructions that come with this medicine. Talk to your doctor or pharmacist if you have any questions. · Missed dose: Take a dose as soon as you remember. If it is almost time for your next dose, wait until then and take a regular dose. Do not take extra medicine to make up for a missed dose. · Store the medicine in a closed container at room temperature, away from heat, moisture, and direct light. Keep the soluble film in the foil pouch until you ready to use it. Drugs and Foods to Avoid: Ask your doctor or pharmacist before using any other medicine, including over-the-counter medicines, vitamins, and herbal products. · Do not use this medicine together with apomorphine. · Some medicines may affect how ondansetron works. Tell your doctor if you are using tramadol, diuretics (water pills), or any other medicine for nausea and vomiting. Warnings While Using This Medicine: · Tell your doctor if you are pregnant or breastfeeding, or if you have liver disease, congestive heart failure, heart rhythm problems (such as prolonged QT interval, slow heartbeat), low magnesium or potassium levels, stomach or bowel problems, or phenylketonuria (PKU). · This medicine may cause heart rhythm problems (such as QT prolongation). · This medicine may make you dizzy. Do not drive or do anything else that could be dangerous until you know how this medicine affects you. · Keep all medicine out of the reach of children. Never share your medicine with anyone. Possible Side Effects While Using This Medicine:  
Call your doctor right away if you notice any of these side effects: · Allergic reaction: Itching or hives, swelling in your face or hands, swelling or tingling in your mouth or throat, chest tightness, trouble breathing · Fainting, dizziness, or lightheadedness · Fast, pounding, or uneven heartbeat · Trouble breathing If you notice these less serious side effects, talk with your doctor: · Constipation or diarrhea 
· Headache · Tiredness or weakness If you notice other side effects that you think are caused by this medicine, tell your doctor. Call your doctor for medical advice about side effects. You may report side effects to FDA at 1-856-GYU-5541 © 2017 [facility]– [place][note 2] Information is for End User's use only and may not be sold, redistributed or otherwise used for commercial purposes. The above information is an  only. It is not intended as medical advice for individual conditions or treatments. Talk to your doctor, nurse or pharmacist before following any medical regimen to see if it is safe and effective for you. Estradiol (Absorbed through the skin) Estradiol (es-tra-DYE-ol) Treats hot flashes and other symptoms of menopause or low estrogen. Brand Name(s): Divigel, EstroGel, Evamist  
There may be other brand names for this medicine. When This Medicine Should Not Be Used: This medicine is not right for everyone. Do not use it if you had an allergic reaction to estradiol, or if you have liver disease, breast cancer, or certain other types of cancer. Do not use it if you have a history of blood clotting problems, or if you had a heart attack or stroke. Do not use this medicine if you may be pregnant, or if you have unusual vaginal bleeding that has not been checked by your doctor. How to Use This Medicine:  
Liquid Mixture, Gel/Jelly, Spray · Take your medicine as directed. Your dose may need to be changed several times to find what works best for you.  This medicine is usually applied once a day, at the same time each day. · Use this medicine only on your skin. Rinse it off right away if it gets on a cut or scrape. Do not get the medicine in your eyes, nose, or mouth. · Read and follow the patient instructions that come with this medicine. Talk to your doctor or pharmacist if you have any questions. · Wash your hands with soap and water before and after you use this medicine. · To use the emulsion:  
¨ Apply the emulsion to your legs. The usual daily dose is 2 foil pouches, 1 for each leg. ¨ Cut or tear open the first pouch at the notches near the top. Squeeze out all of the medicine from the pouch onto the top of your left thigh. Rub the medicine thoroughly into your thigh and calf, for about 3 minutes. Repeat these steps to apply the medicine in the second pouch to the right thigh and calf. ¨ Allow the medicine to dry completely before you get dressed. Wait at least 25 minutes before you put on sunscreen. · To use the gel: ¨ Gel pump: You get the correct dose of estradiol each time you press the pump. You may need to prime the pump by pumping 3 times (EstroGel®) or 10 times (Elestrin) the first time you use it. Follow the patient instructions for the container you use. After you prime the pump, do not press the pump more than 1 time each time you use it. ¨ Apply the gel to clean, dry, and unbroken skin. Spread the gel as thinly as possible over the entire area on the inside and outside of 1 arm from your wrist to your shoulder. Do not apply the medicine directly to your breasts or in or around your vagina. ¨ Do not allow others to come in contact with the area of skin where you applied the gel for at least 1 hour after you use the medicine. Do not allow others to apply the gel for you. Allow the medicine to dry for at least 5 minutes before you get dressed. ¨ Apply sunscreen at least 25 minutes after using Elestrin gel.  Avoid applying sunscreen on the same application site for 7 days or more. ¨ Gel packet: Cut or tear the Divigel® packet. Squeeze the packet contents onto your upper thigh. Gently spread the gel over your upper thigh, covering a space about the size of 2 palm prints. You do not need to massage or rub in the gel. Allow the gel to dry completely before you put on clothes. Alternate between your right and left upper thigh each day. ¨ Do not allow others to come in contact with the area of skin where you applied the gel for at least 1 to 2 hours after you use the medicine. Do not allow others to apply the gel for you. · To use the spray: ¨ The spray form comes in an applicator that delivers the same amount of estradiol with each spray. You need to prime the pump of a new  before you use it. Hold the spray upright and pump it 3 times. You only need to prime the pump the first time you use a new . ¨ Apply the medicine to clean, dry, and unbroken skin on the inside of your forearm between the elbow and the wrist. Do not apply the medicine directly to your breasts or in or around the vagina. ¨ Allow the medicine to dry for at least 2 minutes before you get dressed. Wait at least 1 hour before you wash your skin. ¨ If your doctor tells you to increase your dose, move the applicator to an area of the skin next to your previous application site before you apply the next dose. Do this for each spray. ¨ Do not rub Evamist® spray into your skin. ¨ Always place the protective cover back on the applicator. ¨ Do not use the applicator for more than 56 sprays. ¨ Apply sunscreen at least 1 hour before you apply Evamist®. · The estradiol gel and spray are flammable. Do not use these medicines near an open flame or while smoking. · Store the medicine in a closed container at room temperature, away from heat, moisture, and direct light. Drugs and Foods to Avoid: Ask your doctor or pharmacist before using any other medicine, including over-the-counter medicines, vitamins, and herbal products. · Some foods and medicines can affect how estradiol works. Tell your doctor if you are using Doug's wort, carbamazepine, clarithromycin, erythromycin, itraconazole, ketoconazole, phenobarbital, rifampin, ritonavir, thyroid medicine, or a blood thinner (such as warfarin). · Do not put cosmetics or skin care products on the treated skin. · Do not eat grapefruit or drink grapefruit juice while you are using this medicine. Warnings While Using This Medicine: · Pregnancy after menopause is not likely, but if you think you could be pregnant, tell your doctor. This medicine could harm an unborn baby. · Tell your doctor if you are breastfeeding, or if you have kidney disease, asthma, diabetes, edema (body swelling), endometriosis, epilepsy, migraine headaches, porphyria, lupus, thyroid problems, heart disease, high blood pressure, high cholesterol or triglycerides, inherited angioedema, or a history of cancer. Tell your doctor if you had liver problems caused by pregnancy or estrogen. · This medicine may cause the following problems: 
¨ Higher risk of heart attack, stroke, or blood clots ¨ Higher risk of endometrial cancer, breast cancer, or uterine cancer ¨ Gallbladder disease ¨ Higher risk of dementia · Tell any doctor or dentist who treats you that you are using this medicine. This medicine may affect certain medical test results. You may need to stop using this medicine before you have surgery or if you need to stay in bed for a long time. · Your doctor will check your progress and the effects of this medicine at regular visits. Keep all appointments. You should have regular exams and mammograms as directed by your doctor. · Keep all medicine out of the reach of children. Never share your medicine with anyone. · Do not allow children or pets to touch the skin where you applied the medicine. If this happens, wash the child or pet's skin with soap and water. Possible Side Effects While Using This Medicine:  
Call your doctor right away if you notice any of these side effects: · Allergic reaction: Itching or hives, swelling in your face or hands, swelling or tingling in your mouth or throat, chest tightness, trouble breathing · Breast lumps · Chest pain, trouble breathing, or coughing up blood · Loss of vision, double vision, or other vision changes · Numbness or weakness on one side of your body, sudden or severe headache, problems with vision, speech, or walking · Sudden and severe stomach pain, with or without nausea, vomiting, fever, and lightheadedness · Swelling in your hands, ankles, or feet · Unusual vaginal bleeding or heavy bleeding If you notice these less serious side effects, talk with your doctor: · Changes in weight or hair growth · Headache · Nausea, vomiting, or stomach cramps · Runny or stuffy nose, sore throat, or fever · Skin redness or itching where the medicine is applied · Swollen or tender breasts If you notice other side effects that you think are caused by this medicine, tell your doctor. Call your doctor for medical advice about side effects. You may report side effects to FDA at 5-711-FDA-0675 © 2017 [address] Information is for End User's use only and may not be sold, redistributed or otherwise used for commercial purposes. The above information is an  only. It is not intended as medical advice for individual conditions or treatments. Talk to your doctor, nurse or pharmacist before following any medical regimen to see if it is safe and effective for you. Scopolamine (Absorbed through the skin) Scopolamine ([id number]-EUU-j-meen) Treat nausea and vomiting. Brand Name(s): Transderm Scop There may be other brand names for this medicine. When This Medicine Should Not Be Used: You should not use this medicine if you have had an allergic reaction to scopolamine, or if you have narrow angle glaucoma. How to Use This Medicine:  
Patch · Your doctor will tell you how many patches to use, where to apply them, and how often to apply them. Do not use more patches or apply them more often than your doctor tells you to. · Read and follow the patient instructions that come with this medicine. Talk to your doctor or pharmacist if you have any questions. · To prevent motion sickness, apply the patch at least 4 hours before you need it. · Wash and dry your hands thoroughly before applying the patch. · Leave the patch in its sealed wrapper until you are ready to put it on. Tear the wrapper open carefully. NEVER CUT the wrapper or the patch with scissors. Do not use any patch that has been cut by accident. · Take the liner off the sticky side before applying. · Apply the patch to dry, hairless skin behind the ear. · If the patch is loose or falls off, apply a new patch at a different place behind the ear. · After you take off the patch, wash the place where the patch was and your hands thoroughly. · Only one patch should be used at any time. If a dose is missed: · If you forget to wear or change a patch, put one on as soon as you can. If it is almost time to put on your next patch, wait until then to apply a new patch and skip the one you missed. Do not apply extra patches to make up for a missed dose. How to Store and Dispose of This Medicine: · Store the patches at room temperature in a closed container, away from heat, moisture, and direct light. · Fold the used patch in half with the sticky sides together. Throw any used patch away so that children or pets cannot get to it. You will also need to throw away old patches after the expiration date has passed. · Keep all medicine out of the reach of children. Never share your medicine with anyone. Drugs and Foods to Avoid: Ask your doctor or pharmacist before using any other medicine, including over-the-counter medicines, vitamins, and herbal products. · Tell your doctor if you use anything else that makes you sleepy. Some examples are allergy medicine, narcotic pain medicine, and alcohol. · Do not drink alcohol while you are using this medicine. Warnings While Using This Medicine: · Make sure your doctor knows if you are pregnant or breastfeeding, or if you have glaucoma, prostate problems, trouble urinating, blocked bowels, liver disease, kidney disease, or a history of seizures or mental illness. · This medicine can cause blurring of vision and other vision problems if it comes in contact with the eyes. This medicine may also cause problems with urination. If any of these reactions occur, remove the patch and call your doctor right away. · This medicine may make you dizzy or drowsy. Avoid driving, using machines, or doing anything else that could be dangerous if you are not alert. If you plan to participate in underwater sports, this medicine may cause disorienting effects. If this is a concern for you, talk with your doctor. · This medicine may make you sweat less and cause your body to get too hot. Be careful in hot weather, when you are exercising, or if using a sauna or whirlpool. · Tell any doctor or dentist who treats you that you are using this medicine. This medicine may affect certain medical test results. · Skin burns have been reported at the patch site in several patients wearing an aluminized transdermal system during a magnetic resonance imaging scan (MRI). Because Transderm Sc?p® contains aluminum, it is recommended to remove the system before undergoing an MRI. Possible Side Effects While Using This Medicine:  
Call your doctor right away if you notice any of these side effects: · Allergic reaction: Itching or hives, swelling in your face or hands, swelling or tingling in your mouth or throat, chest tightness, trouble breathing · Blurred vision. · Confusion or memory loss. · Fast, slow, or uneven heartbeat. · Lightheadedness, dizziness, drowsiness, or fainting. · Seeing, hearing, or feeling things that are not there. · Severe eye pain. · Trouble urinating. If you notice these less serious side effects, talk with your doctor: · Dry mouth. · Dry, itchy, or red eyes. · Restlessness. · Skin rash or redness. If you notice other side effects that you think are caused by this medicine, tell your doctor. Call your doctor for medical advice about side effects. You may report side effects to FDA at 6-610-FDA-5291 © 2017 [facility] Information is for End User's use only and may not be sold, redistributed or otherwise used for commercial purposes. The above information is an  only. It is not intended as medical advice for individual conditions or treatments. Talk to your doctor, nurse or pharmacist before following any medical regimen to see if it is safe and effective for you. Please provide this summary of care documentation to your next provider. Signatures-by signing, you are acknowledging that this After Visit Summary has been reviewed with you and you have received a copy. Patient Signature:  ____________________________________________________________ Date:  ____________________________________________________________  
  
[facility] Provider Signature:  ____________________________________________________________ Date:  ____________________________________________________________

## 2018-01-09 NOTE — INTERVAL H&P NOTE
H&P Update:  Jacquelyn Peralta was seen and examined. History and physical has been reviewed. The patient has been examined.  There have been no significant clinical changes since the completion of the originally dated History and Physical.    Signed By: Chela Segura MD     January 9, 2018 12:18 PM

## 2018-01-09 NOTE — ANESTHESIA PREPROCEDURE EVALUATION
Anesthetic History     PONV          Review of Systems / Medical History  Patient summary reviewed and pertinent labs reviewed    Pulmonary  Within defined limits                 Neuro/Psych   Within defined limits           Cardiovascular  Within defined limits                Exercise tolerance: >4 METS     GI/Hepatic/Renal  Within defined limits              Endo/Other  Within defined limits          Comments: Thyroid nodule L upper lobe. Other Findings   Comments:   Risk Factors for Postoperative nausea/vomiting:       History of postoperative nausea/vomiting? YES       Female? YES       Motion sickness? NO       Intended opioid administration for postoperative analgesia? YES      Smoking Abstinence  Current Smoker? NO  Elective Surgery? YES  Seen preoperatively by anesthesiologist or proxy prior to day of surgery? YES  Pt abstained from smoking 24 hours prior to anesthesia?  N/A           Physical Exam    Airway  Mallampati: I  TM Distance: 4 - 6 cm  Neck ROM: normal range of motion   Mouth opening: Normal     Cardiovascular  Regular rate and rhythm,  S1 and S2 normal,  no murmur, click, rub, or gallop  Rhythm: regular  Rate: normal         Dental    Dentition: Lower dentition intact and Upper dentition intact     Pulmonary  Breath sounds clear to auscultation               Abdominal  GI exam deferred       Other Findings            Anesthetic Plan    ASA: 2  Anesthesia type: general          Induction: Intravenous  Anesthetic plan and risks discussed with: Patient

## 2018-01-09 NOTE — ROUTINE PROCESS
Patient arrived to PACU, Ari Gonzalez RN assigned as nurse. Report received from Anesthesia and OR nurse. (Procedure, I&O, Pain, Meds, & Vitals). Pt connected to monitor, Post Op pain & nursing assessment complete, will continue to monitor. 1758: Dr. Luisa Dueñas at the bedside. Patient able to state her name on calling. TRANSFER - OUT REPORT:    [facility]  [facility] to 2200(unit) for routine post - op       Report consisted of patients Situation, Background, Assessment and   Recommendations(SBAR). Information from the following report(s) SBAR, Kardex, OR Summary, Procedure Summary, Intake/Output, MAR, Accordion, Recent Results, Med Rec Status and Cardiac Rhythm NSR was reviewed with the receiving nurse. Lines:   Peripheral IV 01/09/18 Left Wrist (Active)   Site Assessment Clean, dry, & intact 1/9/2018  5:51 PM   Phlebitis Assessment 0 1/9/2018  5:51 PM   Infiltration Assessment 0 1/9/2018  5:51 PM   Dressing Status Clean, dry, & intact 1/9/2018  5:51 PM   Dressing Type Transparent;Tape 1/9/2018  5:51 PM   Hub Color/Line Status Pink; Infusing 1/9/2018  5:51 PM   Action Taken Open ports on tubing capped 1/9/2018  5:51 PM   Alcohol Cap Used Yes 1/9/2018  5:51 PM        Opportunity for questions and clarification was provided. Patient transported with:   Registered Nurse          Patient transferred from stretcher to bed. Family updated. VSS. Pain under control.

## 2018-01-09 NOTE — H&P (VIEW-ONLY)
Dear Dr. Michael Bruno: Your patient, Casandra Daily, was seen at your request in gynecologic oncology consultation today for evaluation and management of left ovarian cyst and elevated CA-125. Anabelle Whitmoresaurav Chief Complaint:  Increased    Diagnosis:  Bilateral ovarian cysts- benign    Treatment:  08/30/12, robotically assisted bilateral ovarian cystectomy and myomectomy and fulguration of endometriosis in which final pathology revealed no evidence of malignancy. Diagnostic Studies:    Interval History:  Ms. Alexa Esquivel is a 44year old with a history of benign ovarian cysts, fibroids and endometriosis for which she underwent robotic assisted bilateral ovarian cystectomy, myomectomy and fulguration of endometriosis. She presented as a new patient on 11/29/2017 for GYN with c/o abdominal and pelvic pains which has persisted for 4 weeks. An interpretation of an U/S performed on 11/29/2017 finds a left ovarian cyst measuring 5.5 cm which is worrisome appearing. The uterus and right ovary are noted as normal. No free fluid was seen in the cul de sac. A  was drawn, result is 285. Her pain has continued in the LLQ. She has regular menses. She has an adopted daughter and does not desire fertility. Past Medical History:  Hernia involving central trocar site- repaired 3/2013  Bilateral endometriomas  Endometriosis  Infertility  Pelvic adhesive disease. Past Surgical History:  08/30/12, robotically assisted bilateral ovarian cystectomy and myomectomy and fulguration of endometriosis in which final pathology revealed no evidence of malignancy. Open epigastric incisional  hernia repair with 4.3 cm diameter mesh    Health Maintenance:  Screenings: Mammogram - Screening (bilateral) on 2012; Pap Smear on 11/29/2017; Pelvic Exam on 11/29/2017  Immunizations: Not given  Never smoker    Review of Systems:  Constitutional: No weight loss, no fever, no chills, night sweats, fatigue.   Cardiovascular:no chest pain, no palpitations, no syncope, no upper extremity edema, no lower extremity edema, no calf discomfort. Respiratory:  no cough, no hemoptysis, no dyspnea, no pleurisy, no wheezing. Breast:  no breast mass, no pain, no nipple discharge, no change in size, no change in shape. Gastrointestinal:  abdominal pain, bloating, no nausea, no vomiting, constipation, no hematochezia, no jaundice, no abdominal cramping, no hematemesis, no diarrhea, no melena, no dyspepsia, no dysphagia. Female urinary:  no dysuria, no hematuria, nocturia, no urinary incontinence. Gynecological:  no vaginal discharge, no suprapubic pain, no abnormal vaginal bleeding. Musculoskeletal: no muscle pain, no swollen joints, no joint redness, no bone pain, no spine tenderness. Neurologic:  no confusion, no seizures, no syncope, no tremor, no speech change, no headache, no hiccups, no abnormal gait, no weakness, no sensory changes. Psychiatric:  no depression, no anxiety, concentration normal.  Endocrine:  no polyuria, no polydipsia, no hot flashes. Hematologic:  no epistaxis, no gingival bleeding, no petechiae, no ecchymosis. Lymphatic:  no lymphadenopathy, no lymphedema. Ob/Gyn History:  Pregnancy Details: Desire for Fertility: No; #Living Children: 1; ; Menopause Information: Premenopausal; ; OB/GYN Medication Hx: IUD: No; Other Contraceptive Hx: No; ; OB/GYN Comments: adopted 1 child    Family History: Mother:  - Breast cancer; Grandmother - Maternal (2nd Degree): Breast cancer; Aunt - Maternal (2nd Degree): Breast cancer    Social History:       Alcohol use:   Recreational drug use: none      Vital Signs:  Vital signs:  HT: 66.5 in, WT: 114.8 lb,  Blood pressure: 112/68, Pulse: 73, Temperature: 98.5 F, Respirations: 12, O2 sat: 97%, Pain Scale: 4, Height: 66.5 in, Weight: 114.8 lb, BSA: 1.56, BMI: 18.25 kg/m2, BMI: 18.25 kg/m2    Physical Exam:  Constitutional:  normal appearance, no acute distress.   Eyes:  conjunctivae normal, eyelids normal, PERRLA, anicteric. Ears: nose, throat:  external ears and nose normal, hearing grossly normal, oropharynx unremarkable. Neck: 1cm smooth round nodule on the left thyroid; supple. Respiratory:  breathing comfortably, lungs clear to auscultation and percussion. Cardiovascular:  normal heart sounds, heart rhythm regular, no peripheral edema. Left breast:  normal appearance, no breast mass, no tenderness. Right breast:  normal appearance, no breast mass, no tenderness. Abdomen: central, LUQ and suprapubic tenderness; no ascites, no masses, no hepatomegaly, no splenomegaly. Pelvic:  external genitalia unremarkable. Vagina and cervix normal.  Uterus is normal size and reasonably mobile; 5-6cm left adnexal mass with reasonable mobility. No nodularity. Accompanied by:  Female staff member. Rectal:  Exam is limited. Anal sphincter tone is normal.  No rectal lesions are noted. Lymph nodes:  no cervical adenopathy, no axillary adenopathy, no supraclavicular adenopathy. Musculoskeletal:  normal muscle strength. Neurologic:  no focal motor deficit, normal gait, no abnormal mental status. Psychiatric:  oriented to person, time and place, mood and affect appropriate to situation, appropriate judgement and insight, memory intact. Assessment:  Left adnexal mass with elevated CA-125 of 285. Family history of breast cancer      Plan:  I discussed the current situation in detail with Ms. Lawrence. I recommended that we make plans to go to the operating room for minimally invasive robotic left oophorectomy, bilateral salpingectomy, possible TLH BSO, staging. The details, risks, and postoperative recovery were reviewed. Her questions were answered to her apparent satisfaction. The patient did not want to schedule surgery and today's visit and will contact the office to schedule. CT of the abdomen and pelvis will be ordered pre-operatively.     We have again discussed the recommendations for genetic counseling/testing. Thank you for the opportunity to share in the care of this pleasant patient. Sincerely,    Laboratory:                             Current Medications: Allergies & Adverse Reactions:  No known medication allergies         Problem List:  Disease (disorder)  Mass of pelvic structure (finding)    New Orders:            12/11/2017, CT abdomen/pelvis w/ contrast, Instructions: Pt prefers [address], Perform Date: STAT, Instructions: Pt prefers [address]                  Other Physicians: MD Dereck Sanchez M.D. Documentation provided by Rene Knowles, for Dr. Harmony Bedolla, 12/11/17. Send copy of note to:   Andrea Dc MD         Electronically signed by Dereck Mak MD 12/13/2017 08:32 EST

## 2018-01-09 NOTE — BRIEF OP NOTE
BRIEF OPERATIVE NOTE    Date of Procedure: 1/9/2018   Preoperative Diagnosis: pelvic mass  Postoperative Diagnosis: pelvic mass    Procedure(s):  davinci total laparoscopic hysterectomy,bilateral salpingo oophorectomy, omentectomy, dissection pelvic lymph nodes, staging XI ROBOTIC ASSISTED  total left  LOBECTOMY  Surgeon(s) and Role:  Panel 1:     * Anabelle Dubose MD - Primary    Panel 2:     * Lenny Monroy MD - Primary         Assistant Staff:       Surgical Staff:  Circ-1: Zuhair Villalpando RN  Scrub Tech-1: Yanet Westbrook  Surg Asst-1: Giacomo Credit  Event Time In   Incision Start 1349   Incision Close      Anesthesia: General   Estimated Blood Loss: minimal  Specimens:   ID Type Source Tests Collected by Time Destination   1 : Left Ovary - gross with frozen as indicated Fresh Ovary  Anabelle Dubose MD 1/9/2018 1427 Pathology   2 : Sigmoid Epiploic Preservative   Anabelle Dubose MD 1/9/2018 1440 Pathology   3 : Right Paracolic gutter Preservative   Anabelle Dubose MD 1/9/2018 1440 Pathology   4 : Portion of omentum Preservative   Anabelle Dubose MD 1/9/2018 1441 Pathology   5 : Right Pelvic Lymph Nodes Preservative   Anabelle Dubose MD 1/9/2018 1457 Pathology   6 : Left External Iliac Lymph Nodes Preservative   Anabelle Dubose MD 1/9/2018 1500 Pathology   7 : Bladder Peritoneum Preservative   Anabelle Dubose MD 1/9/2018 1505 Pathology   8 : Uterus, Cervix, Right Fallopian tube and ovary Preservative   Anabelle Dubose MD 1/9/2018 1519 Pathology   9 : Posterior Cul de Sac [address] MD Isabelle 1/9/2018 1523 Pathology      Findings: bilateral ovarian tumors, 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

## 2018-01-09 NOTE — ANESTHESIA POSTPROCEDURE EVALUATION
Post-Anesthesia Evaluation and Assessment    Patient: Bernardino Carr MRN: [id number]  SSN: [id number]    YOB: 1980  Age: 44 y.o. Sex: female       Cardiovascular Function/Vital Signs  Visit Vitals    /57    Pulse 83    Temp 36.1 °C (97 °F)    Resp 16    Ht 5' 7\" (1.702 m)    Wt 50.3 kg (111 lb)    SpO2 100%    BMI 17.39 kg/m2       Patient is status post general anesthesia for Procedure(s):  davinci total laparoscopic hysterectomy,bilateral salpingo oophorectomy, omentectomy, dissection pelvic lymph nodes, staging XI ROBOTIC ASSISTED  Left Thyroid Lobectomy with Isthmus. Nausea/Vomiting: None    Postoperative hydration reviewed and adequate. Pain:  Pain Scale 1: Numeric (0 - 10) (01/09/18 1842)  Pain Intensity 1: 0 (01/09/18 1842)   Managed    Neurological Status:   Neuro (WDL): Within Defined Limits (01/09/18 1740)   At baseline    Mental Status and Level of Consciousness: Alert and oriented     Pulmonary Status:   O2 Device: Room air (01/09/18 1833)   Adequate oxygenation and airway patent    Complications related to anesthesia: None    Post-anesthesia assessment completed.  No concerns    Signed By: Cass Ash MD     January 9, 2018

## 2018-01-09 NOTE — H&P
Surgical Evaluation        Subjective:      Tim Rivera is a 44 y.o. female with a history of open trocar site hernia repair by me in 2013. She saw me last month complaining of new epigastric and generalized discomfort for the last year. She has had no nausea and no vomiting. The discomfort is dull and deep but seems to be related to new bloating.n This work-up showed no hernia     In addition, she had a finding of a new relatively asymptomatic thyroid nodule. She underwent FNA of the left thyroid nodule after seeing me which showed a suspicious follicular neoplasm. She has remained asymptomatic. Additional work-up showed no adenopathy in the neck. She also has a new 5.5 cm ovarian cyst with elevated Ca-125.  She is to have an oophorectomy with Dr. Juancarlos walker in today's case.     There are no active problems to display for this patient.          Past Medical History:   Diagnosis Date    Eczema      Endometriosis      Moderate precancerous changes of the cervix      Nausea & vomiting       severe nausea    Polycystic disease, ovaries      Unspecified adverse effect of anesthesia       post op pain, severe            Past Surgical History:   Procedure Laterality Date    BREAST SURGERY PROCEDURE UNLISTED   2010     Breast bx left breast    HX BREAST LUMPECTOMY   2010    HX GYN         Robotic BSO, myomectomy    HX LAPAROTOMY        HX OTHER SURGICAL   2004     Isolated Lymphadenectomy of neck for benign enlargement              Social History    Substance Use Topics     Smoking status: Never Smoker     Smokeless tobacco: Never Used     Alcohol use Yes         Comment: occ              Family History   Problem Relation Age of Onset    Cancer Mother         Breast CA    Post-op Nausea/Vomiting Mother      Cancer Maternal Aunt         Breast CA    Cancer Maternal Grandmother         Breast CA    Osteoporosis Father      Lung Disease Father      COPD Father        No current outpatient prescriptions on file.      No current facility-administered medications for this visit. Allergies   Allergen Reactions    Latex Other (comments)       Irritates skin         Review of Systems:  Positive in BOLD - no change from last month     CONST: Fever, weight loss, fatigue or chills  GI: Nausea, vomiting, abdominal pain, change in bowel habits, hematochezia, melena, and GERD, bloating   INTEG: Dermatitis, abnormal moles  HEENT: Recent changes in vision, vertigo, epistaxis, dysphagia and hoarseness, globus  CV: Chest pain, palpitations, HTN, edema and varicosities  RESP: Cough, shortness of breath, wheezing, hemoptysis, snoring and reactive airway disease  : Hematuria, dysuria, frequency, urgency, nocturia and stress urinary incontinence   MS: Weakness, joint pain and arthritis  ENDO: Diabetes, thyroid disease, polyuria, polydipsia, polyphagia, poor wound healing, heat intolerance, cold intolerance  LYMPH/HEME: Anemia, bruising and history of blood transfusions  NEURO: Dizziness, headache, fainting, seizures and stroke  PSYCH: Anxiety and depression     Objective:           Visit Vitals    /76 (BP 1 Location: Left arm, BP Patient Position: At rest)    Pulse 64    Temp 98.1 °F (36.7 °C) (Oral)    Resp 20    Ht 5' 7\" (1.702 m)    Wt 51.7 kg (114 lb)    BMI 17.85 kg/m2         Physical Exam:       GENERAL: alert, cooperative, no distress, appears stated age  EYE:conjunctivae and sclerae normal, pupils equal, round, reactive to light, extraocular movements intact without nystagmus  THROAT & NECK: 3 cm left thyroid nodule, mobile but mildly tender to exam  LUNG: clear to auscultation bilaterally  HEART: Regular rate and rhythm  ABDOMEN: Well healed epigastric hernia repair. Edges of the mesh are palpable due to habitus.  There is no recurrent hernia nor evidence of complication  EXTREMITIES:  extremities normal, atraumatic, no cyanosis or edema  SKIN: Normal.     Imaging and Lab Review: Thyroid US - 12/11/17  FINDINGS:     RIGHT LOBE:  Measurement: 4.8 x 1.5 x 1.3 centimeters  Homogeneous echogenicity. Normal background vascular flow with color Doppler  Interrogation Nodules: Subcentimeter minimally complex nodules measuring up to 0.5 mm. No  suspicious nodules requiring FNA for follow-up.     LEFT LOBE:  Measurement: 5 x 2.3 x 1.9 cm Homogeneous echogenicity. Normal background vascular flow with color Doppler  Interrogation. Nodules: Moderately suspicious Mid to lower pole 2.8 x 1.8 x 1.7 cm cystic and  solid nodule with microcalcifications and peripheral rim calcifications.     ISTHMUS:  Unremarkable. IMPRESSION:      Left lobe of the thyroid gland moderately suspicious 2.8 x 1.8 x 1.7 cm nodule. Recommend fine-needle aspirate biopsy. TI-RADS 4 - Moderately suspicious     Management: FNA if greater than 1.5 cm, follow if greater than 1 cm. Neck US - 1/5/18    No significant cervical lymphadenopathy. FNA 12/22/18    FINE NEEDLE ASPIRATION, LEFT THYROID NODULE:   LIMITED FOR EVALUATION - EXCEEDINGLY SCANT FOLLICULAR CELLS IDENTIFIED. ATYPICAL FOLLICULAR EPITHELIAL CELLS WITH ONCOCYTIC FEATURES. CANNOT RULE OUT NEOPLASTIC PROCESS (SEE MICROSCOPIC DESCRIPTION). AFIRMA - suspicious     Assessment:   1. No hernia recurrence  2. 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     Plan:         Based on size and work-up, this has a 20-40% chance of cancer finding. If it is cancer, it would need total thyroidectomy. Since it is more likely not cancerous, we will proceed with left lobectomy and isthmusectomy and return for completion if indicated.      Risks have been discussed at length and include bleeding, infection, injury to surrounding structures including RLN, SLN, PTHs, blood vessels, temporary or permanent hoarseness, calcium metabolism changes, need for additional procedures, anesthetic risks and imponderables.  She wishes to proceed.

## 2018-01-09 NOTE — BRIEF OP NOTE
BRIEF OPERATIVE NOTE    Date of Procedure: 1/9/2018   Preoperative Diagnosis: left thyroid nodule  Postoperative Diagnosis: same    Procedure(s):    Left Thyroid Lobectomy with Isthmus  Surgeon(s) and Role:  Panel 1:     * Morales Grewal MD - Primary    Panel 2:     * Coleen Pham MD - Primary         Assistant Staff:       Surgical Staff:  Circ-1: James Hernandez, RN  Scrub Tech-1: Leobardo Goad Felty; Burnetta Cutting  Surg Asst-1: Ang Greenville  Surg Asst-2: Leo Barrier; Danay Zachary  Event Time In   Incision Start 1349   Incision Close 1721     Anesthesia: General   Estimated Blood Loss: 10 ml  Specimens:   ID Type Source Tests Collected by Time Destination   1 : Left Ovary - gross with frozen as indicated Fresh Ovary  Morales Grewal MD 1/9/2018 1427 Pathology   2 : Sigmoid Epiploic Preservative   Morales Grewal MD 1/9/2018 1440 Pathology   3 : Right Paracolic gutter Preservative   Morales Grewal MD 1/9/2018 1440 Pathology   4 : Portion of omentum Preservative   Morales Grewal MD 1/9/2018 1441 Pathology   5 : Right Pelvic Lymph Nodes Preservative   Morales Grewal MD 1/9/2018 1457 Pathology   6 : Left External Iliac Lymph Nodes Preservative   Morales Grewal MD 1/9/2018 1500 Pathology   7 : Bladder Peritoneum Preservative   Morales Grewal MD 1/9/2018 1505 Pathology   8 : Uterus, Cervix, Right Fallopian tube and ovary Preservative   Morales Grewal MD 1/9/2018 1519 Pathology   9 : Posterior Cul de Sac Preservative   Morales Grewal MD 1/9/2018 1523 Pathology   10 : Left Thyroid Lobe and Isthmus Preservative   Morales Grewal MD 1/9/2018 1703 Pathology      Findings: Inferior based thyroid nodule without fixation or adenopathy.  Calcific   Complications: None  Implants: * No implants in log *    [id number]

## 2018-01-09 NOTE — OP NOTES
[facility]  OPERATIVE REPORT    Saniya Gibson  MR#: [id number]  : 1980  ACCOUNT #: [de-identified]   DATE OF SERVICE: 2018    SURGEON:  Freddy Thomas MD    ASSISTANT:  Jazzy Quezada SA    PREOPERATIVE DIAGNOSIS:  Left ovarian mass and elevated CA-125. 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 PROCEDURES:  1.  Robotic total hysterectomy with bilateral salpingo-oophorectomy. 2.  Robotic omentectomy. 3.  Robotic pelvic lymph node sampling. 4.  Robotic staging biopsies. ANESTHESIA:  General.    COMPLICATIONS:  None. ESTIMATED BLOOD LOSS:  Minimal.    SPECIMENS REMOVED:   .    DESCRIPTION OF PROCEDURE FINDINGS:  There were extensive adhesions from the liver to the anterior and superior peritoneum surrounding the liver. The omentum was found to contain very fine nodularity and it was uncertain grossly whether this represented endometriosis or malignancy. There was subtle nodularity in the pericolic gutters, the posterior cul-de-sac and the bladder peritoneum. The left ovary was enlarged to approximately 6 cm and had an obvious tumor on the surface. The right ovary was somewhat smaller, but had a similar appearance. Frozen section analysis revealed borderline serous malignancy. Inspection of the retroperitoneum revealed no prominent lymph nodes in the periaortic area. There was 1 prominent lymph node in the left external iliac area and a group of prominent lymph nodes in the right external and interiliac areas. TECHNIQUE:  After the patient was identified in the operating room, she was placed under general anesthesia by the anesthesia team.  She was sterilely prepped and draped in the modified lithotomy position. The cervix and uterus were secured with a V-Care. An incision was made just above the umbilicus in the midline and an 8 mm Optiview trocar was inserted without difficulty.   A pneumoperitoneum was created. Inspection was performed with the findings as listed above. Additional robotic ports were placed in the right and left mid abdomen and a 10 mm accessory port was placed in the right lower quadrant. The patient was placed in Trendelenburg position and the robot was docked. The left retroperitoneum was widely opened and the ureter was identified. The infundibulopelvic ligament was skeletonized, coagulated and divided with a significant margin away from the ovary. The peritoneal attachments and the uteroovarian anastomosis were thoroughly coagulated and divided. The adnexa was placed in an Endobag and removed through the right lower quadrant site. It was sent for frozen section analysis with the findings as listed above. The entire infracolic omentum was then removed using bipolar and monopolar cautery. It was placed in the cul-de-sac for later removal.  Representative biopsies were obtained from the subtle nodularity that was mentioned. These included the pericolic gutters, the left and right pelvic sidewalls, the bladder peritoneum and the posterior cul-de-sac. The retroperitoneum was widely opened and explored with the findings as listed above. The prominent lymph node on the left was dissected and removed. The prominent group of lymph nodes on the right was dissected and removed. The round ligaments were then coagulated and divided. The right infundibulopelvic ligament was skeletonized, coagulated and divided with attention to the position of the ureter. The bladder was sharply dissected from the cervix and upper vagina. The uterine vessels were skeletonized, thoroughly coagulated and divided. The cardinal ligaments were divided with electrocautery and electrocautery was used to circumscribe the colpotomizer cup. The primary specimen and the omentum were removed through the vagina. The vagina was then oversewn in hemostatic fashion with running 0 V-Loc.   The pelvis was copiously irrigated and hemostasis was noted to be excellent. The right lower quadrant site was closed with interrupted sutures of 0 Vicryl under direct vision using the Roni-Yvan device. The robotic instruments and laparoscope were removed under direct vision. The robot was undocked. The pneumoperitoneum was deflated and the ports were removed. The fascia at the midline camera port was closed with interrupted 2-0 Vicryl. All of the skin incisions were closed with subcuticular 4-0 Monocryl and Dermabond was applied. The patient was in stable condition and all needle, sponge and instrument counts were noted to be correct at the end of the procedure. The patient remained under general anesthesia for a subsequent thyroid procedure by Dr. Rehana Pollard, contained in the separate operative report.       Carolin Can MD       [facility]  D: 01/09/2018 16:15     T: 01/09/2018 18:00  JOB #: [id number]  CC: Miranda Willoughby MD  [address]  CC: Kirsten Carvajal MD  [place], [place]

## 2018-01-09 NOTE — PERIOP NOTES
Pt's point of contacts for MD to speak to are: Argentina Brower, significant other and sister Barrera Goodmanpraful.

## 2018-01-10 LAB
ANION GAP SERPL CALC-SCNC: 12 MMOL/L (ref 3–18)
BUN SERPL-MCNC: 15 MG/DL (ref 7–18)
BUN/CREAT SERPL: 19 (ref 12–20)
CALCIUM SERPL-MCNC: 8.8 MG/DL (ref 8.5–10.1)
CHLORIDE SERPL-SCNC: 103 MMOL/L (ref 100–108)
CO2 SERPL-SCNC: 24 MMOL/L (ref 21–32)
CREAT SERPL-MCNC: 0.77 MG/DL (ref 0.6–1.3)
ERYTHROCYTE [DISTWIDTH] IN BLOOD BY AUTOMATED COUNT: 12.1 % (ref 11.6–14.5)
GLUCOSE SERPL-MCNC: 128 MG/DL (ref 74–99)
HCT VFR BLD AUTO: 35.5 % (ref 35–45)
HGB BLD-MCNC: 11.9 G/DL (ref 12–16)
MCH RBC QN AUTO: 31.7 PG (ref 24–34)
MCHC RBC AUTO-ENTMCNC: 33.5 G/DL (ref 31–37)
MCV RBC AUTO: 94.7 FL (ref 74–97)
PLATELET # BLD AUTO: 200 K/UL (ref 135–420)
PMV BLD AUTO: 10.5 FL (ref 9.2–11.8)
POTASSIUM SERPL-SCNC: 4.1 MMOL/L (ref 3.5–5.5)
RBC # BLD AUTO: 3.75 M/UL (ref 4.2–5.3)
SODIUM SERPL-SCNC: 139 MMOL/L (ref 136–145)
WBC # BLD AUTO: 14.2 K/UL (ref 4.6–13.2)

## 2018-01-10 PROCEDURE — 85027 COMPLETE CBC AUTOMATED: CPT | Performed by: SURGERY

## 2018-01-10 PROCEDURE — 99218 HC RM OBSERVATION: CPT

## 2018-01-10 PROCEDURE — 74011250636 HC RX REV CODE- 250/636: Performed by: SURGERY

## 2018-01-10 PROCEDURE — 36415 COLL VENOUS BLD VENIPUNCTURE: CPT | Performed by: SURGERY

## 2018-01-10 PROCEDURE — 80048 BASIC METABOLIC PNL TOTAL CA: CPT | Performed by: SURGERY

## 2018-01-10 PROCEDURE — 74011250637 HC RX REV CODE- 250/637: Performed by: STUDENT IN AN ORGANIZED HEALTH CARE EDUCATION/TRAINING PROGRAM

## 2018-01-10 PROCEDURE — 74011250636 HC RX REV CODE- 250/636: Performed by: STUDENT IN AN ORGANIZED HEALTH CARE EDUCATION/TRAINING PROGRAM

## 2018-01-10 PROCEDURE — 77030027138 HC INCENT SPIROMETER -A

## 2018-01-10 RX ORDER — DEXAMETHASONE SODIUM PHOSPHATE 4 MG/ML
4 INJECTION, SOLUTION INTRA-ARTICULAR; INTRALESIONAL; INTRAMUSCULAR; INTRAVENOUS; SOFT TISSUE ONCE
Status: COMPLETED | OUTPATIENT
Start: 2018-01-10 | End: 2018-01-10

## 2018-01-10 RX ORDER — HYDROMORPHONE HYDROCHLORIDE 2 MG/1
2 TABLET ORAL
Status: DISCONTINUED | OUTPATIENT
Start: 2018-01-10 | End: 2018-01-11 | Stop reason: HOSPADM

## 2018-01-10 RX ORDER — PROMETHAZINE HYDROCHLORIDE 25 MG/ML
12.5 INJECTION, SOLUTION INTRAMUSCULAR; INTRAVENOUS
Status: DISCONTINUED | OUTPATIENT
Start: 2018-01-10 | End: 2018-01-11

## 2018-01-10 RX ORDER — ONDANSETRON 2 MG/ML
6 INJECTION INTRAMUSCULAR; INTRAVENOUS
Status: DISCONTINUED | OUTPATIENT
Start: 2018-01-10 | End: 2018-01-11 | Stop reason: HOSPADM

## 2018-01-10 RX ORDER — DEXTROSE, SODIUM CHLORIDE, AND POTASSIUM CHLORIDE 5; .45; .15 G/100ML; G/100ML; G/100ML
75 INJECTION INTRAVENOUS CONTINUOUS
Status: DISCONTINUED | OUTPATIENT
Start: 2018-01-10 | End: 2018-01-11 | Stop reason: HOSPADM

## 2018-01-10 RX ORDER — ENOXAPARIN SODIUM 100 MG/ML
30 INJECTION SUBCUTANEOUS EVERY 24 HOURS
Status: DISCONTINUED | OUTPATIENT
Start: 2018-01-11 | End: 2018-01-11

## 2018-01-10 RX ORDER — SCOLOPAMINE TRANSDERMAL SYSTEM 1 MG/1
1 PATCH, EXTENDED RELEASE TRANSDERMAL
Status: DISCONTINUED | OUTPATIENT
Start: 2018-01-10 | End: 2018-01-11 | Stop reason: HOSPADM

## 2018-01-10 RX ORDER — IBUPROFEN 400 MG/1
400 TABLET ORAL
Status: DISCONTINUED | OUTPATIENT
Start: 2018-01-10 | End: 2018-01-11 | Stop reason: HOSPADM

## 2018-01-10 RX ADMIN — ONDANSETRON 4 MG: 2 INJECTION INTRAMUSCULAR; INTRAVENOUS at 07:24

## 2018-01-10 RX ADMIN — ONDANSETRON 6 MG: 2 INJECTION INTRAMUSCULAR; INTRAVENOUS at 12:49

## 2018-01-10 RX ADMIN — PROMETHAZINE HYDROCHLORIDE 12.5 MG: 25 INJECTION INTRAMUSCULAR; INTRAVENOUS at 16:30

## 2018-01-10 RX ADMIN — Medication 10 ML: at 16:31

## 2018-01-10 RX ADMIN — IBUPROFEN 400 MG: 400 TABLET ORAL at 12:49

## 2018-01-10 RX ADMIN — DEXAMETHASONE SODIUM PHOSPHATE 4 MG: 4 INJECTION, SOLUTION INTRAMUSCULAR; INTRAVENOUS at 08:12

## 2018-01-10 RX ADMIN — IBUPROFEN 400 MG: 400 TABLET ORAL at 19:09

## 2018-01-10 RX ADMIN — Medication 10 ML: at 13:00

## 2018-01-10 RX ADMIN — PROMETHAZINE HYDROCHLORIDE 12.5 MG: 25 INJECTION INTRAMUSCULAR; INTRAVENOUS at 09:41

## 2018-01-10 RX ADMIN — HYDROMORPHONE HYDROCHLORIDE 2 MG: 2 TABLET ORAL at 16:30

## 2018-01-10 RX ADMIN — DEXTROSE MONOHYDRATE, SODIUM CHLORIDE, AND POTASSIUM CHLORIDE 75 ML/HR: 50; 4.5; 1.49 INJECTION, SOLUTION INTRAVENOUS at 08:12

## 2018-01-10 RX ADMIN — HYDROMORPHONE HYDROCHLORIDE 1 MG: 2 INJECTION INTRAMUSCULAR; INTRAVENOUS; SUBCUTANEOUS at 00:02

## 2018-01-10 RX ADMIN — ONDANSETRON 4 MG: 2 INJECTION INTRAMUSCULAR; INTRAVENOUS at 03:39

## 2018-01-10 RX ADMIN — HYDROMORPHONE HYDROCHLORIDE 2 MG: 2 TABLET ORAL at 20:52

## 2018-01-10 RX ADMIN — ONDANSETRON 6 MG: 2 INJECTION INTRAMUSCULAR; INTRAVENOUS at 20:52

## 2018-01-10 RX ADMIN — ENOXAPARIN SODIUM 40 MG: 40 INJECTION SUBCUTANEOUS at 06:51

## 2018-01-10 RX ADMIN — OXYCODONE HYDROCHLORIDE AND ACETAMINOPHEN 2 TABLET: 5; 325 TABLET ORAL at 03:44

## 2018-01-10 RX ADMIN — DEXTROSE MONOHYDRATE, SODIUM CHLORIDE, AND POTASSIUM CHLORIDE 75 ML/HR: 50; 4.5; 1.49 INJECTION, SOLUTION INTRAVENOUS at 20:56

## 2018-01-10 NOTE — PROGRESS NOTES
Patient has designated hersister to participate in his/her discharge plan and to receive any needed information.      Name:   Keli hurt   [phone]      Rafael 10, 2018     Address:  Phone number:

## 2018-01-10 NOTE — OP NOTES
[facility]  OPERATIVE REPORT    Linda Mendenhall  MR#: [id number]  : 1980  ACCOUNT #: [de-identified]   DATE OF SERVICE: 2018    PREOPERATIVE DIAGNOSIS:  Left thyroid nodule. POSTOPERATIVE DIAGNOSIS:  Left thyroid nodule. PROCEDURE PERFORMED:  Left thyroid lobectomy with isthmectomy. SURGEON:  Anton Starks MD     ASSISTANT:  Blue Sousa and  Steve Hutchins     INCISION START:  [facility]  [facility]. ANESTHESIA:  General endotracheal anesthesia. ESTIMATED BLOOD LOSS:  10 mL     SPECIMEN REMOVED:  Left thyroid lobe and isthmus sent for permanent. FINDINGS:  An inferiorly based left thyroid nodule without fixation or adenopathy. It was calcific. There was some inflammatory adherence to the strap muscles inferiorly that easily dissected free. COMPLICATIONS:  None. IMPLANTS:  None. DESCRIPTION OF PROCEDURE  A Kocher incision was made approximately 2 fingerbreadths above the sternal notch approximately 6 cm in length, centered in the midline. This was carried through the skin with scalpel through the subcutaneous tissues and platysma using Bovie cautery. Infraplatysmal flaps were raised to the level of the hyoid bone superiorly and the sternal notch inferiorly. The skin flaps were then retracted and sewn to the operative drapes with sutures of 2-0 silk. The strap muscles were then incised in the midline. The left strap muscles were elevated off the left thyroid lobe. As we did this, the nodule came into view and was rotated medially, exposing the middle thyroidal vein coursing around it, and this was divided with Harmonic scalpel. The lobe was rotated further medially. There was some adherence of the strap muscle to this, which was dissected off using blunt and Bovie dissection. We then noted that the nodule appeared to be within the thyroid, was actually quite calcific and without fixation.   The thyroid itself was very mobile in the neck and was able to be rotated well medially, exposing its posterior margin. Dissection was carried along the posterior margin using blunt and Harmonic dissection, exposing the inferior thyroidal vein, which was divided with Harmonic scalpel. Then worked our way superiorly to the superior pole, dissecting the superior thyroidal artery and vein free, and sparing the superior laryngeal nerve and the dissection. The superior thyroidal artery and vein were divided with Harmonic scalpel and the entire superior pole was rotated inferiorly and medially. During this dissection, the superior parathyroid gland was identified and dissected away from the thyroidal pole using blunt and Harmonic dissection until it was fully mobilized free. With the gland rotated well medial, the recurrent laryngeal nerve was easily identified and protected along its course. Working on a more superficial plane than the nerve, the 2 branches to the inferior thyroidal artery were identified, dissected and divided with Harmonic scalpel. This allowed the thyroid to rotate medially away from the nerve, and then dissection was taken to take it off of Berry ligament using Harmonic scalpel until it was mobilized all the way to the midline, and then across the midline with Bovie dissection until the entire isthmus was mobilized. The isthmus was dissected away from the right lobe of the thyroid with Harmonic scalpel, and the left lobe with associated nodule was passed off the field for permanent specimen. The recurrent laryngeal nerve was identified and tracked along its course once again and noted to be undisturbed. There was no evidence of bleeding, the vessels were well controlled. The wound was irrigated clean. The strap muscles were reapproximated in the midline with a running 3-0 Vicryl suture.   The platysma was reapproximated with interrupted 3-0 Vicryl suture, and the skin was closed with a running 4-0 Monocryl subcuticular closure and dressed with sterile dressing. The patient tolerated the procedure well.       MD LUCIANA Fortune / ENRIQUE  D: 01/09/2018 17:53     T: 01/09/2018 23:11  JOB #: [id number]

## 2018-01-10 NOTE — PROGRESS NOTES
Received bedside shift report from [facility]. Pt is nauseous in bed, family at bedside. White board updated, call bell within reach. In report, received that pt has not voided, has only had roughly 300mL of oral fluid intake. 7218 Received from [address] [place] [address] that pt had 500mL of green emesis, they had administered PRN zofran. [address]051 Educated pt and family on plan for nausea management. Verbalized understanding. Administered one time dose of decadron. 0947 Pt still nauseous, administered PRN Phernergan. Pt has not voided yet. 1022 Pt asleep in bed, safety measures in place. 210 [facility], RN, reported that pt had voided 100mL of clear yellow urine. 1132 Pt asleep in bed, family and boyfriend at bedside. Requesting saltine crackers, provided. 12 Paged Dr. Clarice Galvez to update on pt status. Pt also requesting a different pain medication. Pt states that she is less nauseous, but she is still nauseous, however, she has not dry heaved or thrown up since the phenergan. [phone] Dr. Clarice Galvez updated on urinary output, pain medication and nausea/vomiting. Received telephone orders for motrin 400mg PO Q6H and for 2mg PO Dilaudid Q4H PRN for pain. States he is ok with urinary output. Updated on what is being done currently for pt's nausea, no new orders received for that. Received telephone order to also change discharge order to be contingent on pt tolerating her diet. 1630Pt still complaining of nausea, administered PRN phenergan and PRN dilaudid PO. Pt tolerated both. 18 Pt assisted to bathroom, ambulated in hallway, and then to chair. 1745 Pt assisted back to bed.     1900 PRN ibuprofen administered. Bedside shift change report given to Pilo Banks RN (oncoming nurse) by Cuate Salinas RN (offgoing nurse). Report included the following information SBAR, Kardex, OR Summary, Intake/Output, MAR and Recent Results.

## 2018-01-10 NOTE — PROGRESS NOTES
31 [facility] Nura pager: [phone]                                             Office: [phone]                                                              Fax: [phone] PROGRESS NOTE     PATIENT NAME:  Tania Navarrete  MRN:                     [id number]  DATE:                    1/10/2018, 4:42 PM   ASSESSMENT  AND  PLAN   Tania Navarrete is a 44 y.o. 44 y.o. F s/p RA TALH BSO, LND, , for Pelvic mass (frozen section: low malignant potential)    1. POD#1  Overall improved condition   Encourage IS   Encourage ambulation   Voiding spontaneously  Tolerated  minimal PO intake , still having nausea/vomiting  Will switch from percocet to Dilaudid for irma control,     2. Pain   · Controlled with Dilaudid and Percocet PRN     3. Cardiovascular   · Blood pressure /60-63, HR 67-93  · No further issues     4. Respiratory   ·  saturations on RA   · No further issues     5. GI   · Tolerating minmal PO  · Zofran PRN, phenergan, will add scopolamine patch     6. ID   · Tmax 98.3, at 0600   · WBC 14.2 post-op     7.    · Creatinine 0.77   · Adequate UOP   · Voiding spontaneously     8. Heme   · HgB pre-op 12.4 => 11.9        9. Prophylaxis   · VTE: SCDs and ambulation, Lovenox         10. Surgery   · Pending final pathology  ·    SUBJECTIVE   Resting comfortably with no current concerns. Her pain is well controlled with Dilaudid PO. She is tolerating minimal PO diet and has worsening Nausea/ vomiting since am  She is ambulating and voiding spontaneously. She has  passed minimal flatus.    PHYSICAL EXAM     Vital Signs   Visit Vitals    /58 (BP 1 Location: Right arm, BP Patient Position: At rest)    Pulse 93    Temp 98 °F (36.7 °C)    Resp 16    Ht 5' 7\" (1.702 m)    Wt 50.3 kg (111 lb)    LMP 01/05/2018    SpO2 98%    BMI 17.39 kg/m2         GENERAL:       NAD, AAOx3   CV:                    RRR, nml S1S2   PULMONARY: CTAB   ABDOMEN:      Soft, ND, NT, minimal BS,   EXT:                  No c/c/e     LABORATORY   CBC  Recent Labs      01/10/18   0415   WBC  14.2*   HCT  35.5       Basic Metabolic Profile   Recent Labs      01/10/18   0415   NA  139   CO2  24   BUN  15       Coags  No results found for: APTT, INR      Tomy Neri MD pgy_3  1/10/2018,  4:42 PM  Gyn ONC Pager: [phone]    Please page oncology pager with questions  During the day, between 0530 AM and 441 0134 PM please page [phone]  Overnight, between 441 0134 PM and 0530AM please page [phone]    If no response, please page Dr. Galvez Aruna [phone]  If still no response, please page Dr. Andreas Moise at [phone]

## 2018-01-10 NOTE — PROGRESS NOTES
conducted an initial consultation and Spiritual Assessment for London Cortez, who is a 44 y.o.,female. Patients Primary Language is: [place]. According to the patients EMR Zoroastrianism Affiliation is: Moravian. The reason the Patient came to the hospital is:   Patient Active Problem List    Diagnosis Date Noted    S/P laparoscopic hysterectomy 01/09/2018    Ovarian cancer on left [facility]) 01/09/2018        The  provided the following Interventions:  Initiated a relationship of care and support with patient and family members in room 2204 today. Listened  patient who  is not feeling well today and a bit upset in her stomach at present talked about her being here. She also shared with me her hopes are to still be able to go home this afternoon as the doctor said this morning. Provided information about Spiritual Care Services. Offered prayer and assurance of continued prayers on patients behalf. The following outcomes were achieved:  Patient shared limited information about her medical narrative and spiritual journey/beliefs. Patient processed feeling about current hospitalization. Patient expressed gratitude for pastoral care visit. Assessment:  Patient does not have any Gnosticist/cultural needs that will affect patients preferences in health care. There are no further spiritual or Gnosticist issues which require Spiritual Care Services interventions at this time. Plan:  Chaplains will continue to follow and will provide pastoral care on an as needed/requested basis    . Abdelrahman Esquivel   Spiritual Care   [phone]

## 2018-01-10 NOTE — PROGRESS NOTES
Problem: Falls - Risk of  Goal: *Absence of Falls  Document Berenice Fall Risk and appropriate interventions in the flowsheet.    Outcome: Progressing Towards Goal  Fall Risk Interventions:  Mobility Interventions: Patient to call before getting OOB         Medication Interventions: Patient to call before getting OOB    Elimination Interventions: Patient to call for help with toileting needs, Call light in reach

## 2018-01-10 NOTE — PROGRESS NOTES
95 [facility] [place] pager: [phone]                                             Office: [phone]                                                              Fax: [phone] NOTE     PATIENT NAME:  London Cortez  MRN:                     [id number]  DATE:                    1/10/2018, 6:02 AM   ASSESSMENT  AND  PLAN   London Cortez is a 44 y.o. F s/p RA TALH BSO, LND, , for Pelvic mass (frozen section: low malignant potential)    1. POD#1  Overall improved condition   Encourage IS   Encourage ambulation   Voiding spontaneously   Tolerating clear sips of water  Pain controlled on Norco PRN    2. Pain   Controlled with Dilaudid and Percocet PRN    3. Cardiovascular   Blood pressure /60-63, HR 67-93  No further issues    4. Respiratory    saturations on RA   No further issues    5. GI   Tolerating a Regular diet  Active GI function now    6. ID   Tmax 98.3, at 0600   WBC 14.2 post-op    7.    Creatinine 0.77   Adequate UOP   Voiding spontaneously    8. Heme   HgB pre-op 12.4 => 11.9       9. Prophylaxis   VTE: SCDs and ambulation, Lovenox       10. Surgery   Pending final pathology     SUBJECTIVE   Resting comfortably with no current concerns. Her pain is well controlled with Dilaudid and percocet. She is tolerating a reg diet without N/V. She is ambulating and voiding spontaneously. She has not passed flatus.    PHYSICAL EXAM     Vital Signs   Visit Vitals    BP 96/60 (BP 1 Location: Right arm, BP Patient Position: At rest)    Pulse 87    Temp 98 °F (36.7 °C)    Resp 16    Ht 5' 7\" (1.702 m)    Wt 50.3 kg (111 lb)    LMP 01/05/2018    SpO2 91%    BMI 17.39 kg/m2         UOP: voiding spontaneously  Drains: none    GENERAL:       NAD, AAOx3   CV:                    RRR, nml S1S2   PULMONARY: CTAB   ABDOMEN:      Soft, ND, NT, minimal BS, Incision C/D/I  EXT:                  No c/c/e LABORATORY   CBC  Recent Labs      01/10/18   0415   WBC  14.2*   HCT  35.5       Basic Metabolic Profile   Recent Labs      01/10/18   0415   NA  139   CO2  24   BUN  15       Jenaro Peguero MD pgy_3  1/10/2018,  6:02 AM  Gyn [facility] [address] Pager: [phone]    Please page oncology pager with questions  During the day, between 0530 AM and 441 0134 PM please page [phone]  Overnight, between 441 0134 PM and 0530AM please page [phone]    If no response, please page Dr. Dawson Corona at [phone]  If still no response, please page Dr. Carol Abdul at [phone]

## 2018-01-10 NOTE — PROGRESS NOTES
[facility] [address]   Discharge Planning/ Assessment    Reasons for Intervention: Interviewed patient, she agrees to share her discharge information with her sister, see below. Demographic information verified. She was independent prior to admission and sees Dr Caitlyn Osorio for her primary care needs. Her discharge plan is to return home.      High Risk Criteria  [x] Yes  []No   Physician Referral  [] Yes  [x]No        Date    Nursing Referral  [] Yes  [x]No        Date    Patient/Family Request  [] Yes  [x]No        Date       Resources:    Medicare  [] Yes  [x]No   Medicaid  [] Yes  [x]No   No Resources  [] Yes  [x]No   Private Insurance  [x] Yes  []No atnea    Name/Phone Number    Other  [] Yes  [x]No        (i.e. Workman's Comp)         Prior Services:    Prior Services  [] Yes  [x]No   Home Health  [] Yes  [x]No   [address]  [] Yes  [x]No        Number of [address]  [] Yes  [x]No       Meals on Wheels  [] Yes  [x]No   Office on Aging  [] Yes  [x]No   Transportation Services  [] Yes  [x]No   Nursing Home  [] Yes  [x]No        [facility]    [facility]  [] Yes  [x]No        [address] Name    Other       Information Source:      Information obtained from  [x] Patient  [] Parent   [] [address]   [] Child  [] Spouse   [] Significant Other/Partner   [] Friend      [] EMS    [] Nursing Home Chart          [x] Other: chart   Chart Review  [x] Yes  []No     Family/Support System:    Patient lives with  [] Alone    [] Spouse   [] Significant Other  [] Children  [] Caretaker   [] Parent  [] Sibling     [] Other       Other Support System:    Is the patient responsible for care of others  [] Yes  [x]No   Information of person caring for patient on  discharge self   Managers financial affairs independently  [x] Yes  []No   If no, explain:      Status Prior to Admission:    Mental Status  [x] Awake  [x] Alert  [x] Oriented  [] Quiet/Calm [] Lethargic/Sedated   [] Disoriented  [] Restless/Anxious  [] Combative   Personal Care  [] Dependent  [x] Independent Personal Care  [] Requires Assistance   Meal Preparation Ability  [x] Independent   [] Standby Assistance   [] Minimal Assistance   [] Moderate Assistance  [] Maximum Assistance     [] Total Assistance   Chores  [x] Independent with Chores   [] N/A Nursing Home Resident   [] Requires Assistance   Bowel/Bladder  [x] Continent  [] Catheter  [] Incontinent  [] Ostomy Self-Care    [] Urine Diversion Self-Care  [] Maximum Assistance     [] Total Assistance   Number of Persons needed for assistance    DME at home  [] Andrew Ditch, Sarahann Locks  [] Andrew Ditch, Straight   [] Commode    [] Bathroom/Grab Bars  [] Hospital Bed  [] Nebulizer  [] Oxygen           [] Raised Toilet Seat  [] Shower Chair  [] Side Rails for Bed   [] Tub Transfer Bench   [] Thera Sherry  [] Luis Fernando Mofrankieing, Standard      [] Other:   Vendor      Treatment Presently Receiving:    Current Treatments  [] Chemotherapy  [] Dialysis  [] Insulin  [] IVAB [x] IVF   [] O2  [] PCA   [] PT   [] RT   [] Tube Feedings   [] Wound Care     Psychosocial Evaluation:    Verbalized Knowledge of Disease Process  [x] Patient  [x]Family   Coping with Disease Process  [x] Patient  [x]Family   Requires Further Counseling Coping with Disease Process  [] Patient  []Family     Identified Projected Needs:    Home Health Aid  [] Yes  [x]No   Transportation  [] Yes  [x]No   Education  [] Yes  [x]No        Specific Education     Financial Counseling  [] Yes  [x]No   Inability to Care for Self/Will Require 24 hour care  [] Yes  [x]No   Pain Management  [] Yes  [x]No   Home Infusion Therapy  [] Yes  [x]No   Oxygen Therapy  [] Yes  [x]No   DME  [] Yes  [x]No   Long Term Care Placement  [] Yes  [x]No   Rehab  [] Yes  [x]No   Physical Therapy  [] Yes  [x]No   Needs Anticipated At This Time  [] Yes  [x]No     Intra-Hospital Referral:    [address] [facility]  [] Yes  [x]No     [] Yes  [x]No Patient Representative  [] Yes  [x]No   Staff for Teaching Needs  [] Yes  [x]No   Specialty Teaching Needs     Diabetic Educator  [] Yes  [x]No   Referral for Diabetic Educator Needed  [] Yes  [x]No  If Yes, place order for Nutritionist or Diabetic Consult     Tentative Discharge Plan:    Home with No Services  [x] Yes  []No   Home with [address]  [] Yes  [x]No        If Yes, specify type    Home Care Program  [] Yes  [x]No        If Yes, specify type    Meals on Wheels  [] Yes  [x]No   Office of Aging  [] Yes  [x]No   NHP  [] Yes  [x]No   Return to the Nursing Home  [] Yes  [x]No   Rehab Therapy  [] Yes  [x]No   Acute Rehab  [] Yes  [x]No   Subacute Rehab  [] Yes  [x]No   Private Care  [] Yes  [x]No   Substance Abuse Referral  [] Yes  [x]No   Transportation  [] Yes  [x]No   Chore Service  [] Yes  [x]No   Inpatient Hospice  [] Yes  [x]No   OP RT  [] Yes  [x] No   OP Hemo  [] Yes  [x] No   OP PT  [] Yes  [x]No   Support Group  [] Yes  [x]No   Reach to Recovery  [] Yes  [x]No   OP Oncology Clinic  [] Yes  [x]No   Clinic Appointment  [] Yes  [x]No   DME  [] Yes  [x]No   Comments    Name of D/C Planner or  Given to Patient or Family Macrina Garcia RN   Phone Number [phone] [facility]   Date Rafael 10, 2018   Time 708 am   If you are discharged home, whom do you designate to participate in your discharge plan and receive any information needed?      Enter name of Kishor Felton jamesjadyn  sister        Phone # of [facility]         Address of [place]         Updated Rafael 10, 2018        Patient refused to designate any           individual

## 2018-01-10 NOTE — PROGRESS NOTES
2131 Patient in bed resting with family at the bedside. Assessed for pain with  6/10 pain scale, offered po percocet until dilaudid iv due but pt refused stating \"i wait for dilaudid\"     2862 Attempted to assist pt with ambulation pt sleeping at this time. Family at the bed side and requested if it can be done later since pt just fall a sleep. Will return and attempt again. 5579 Patient assisted oob ambulated in the hallway tolerated with minimal discomfort. Marlena bill'd per order.

## 2018-01-10 NOTE — PROGRESS NOTES
Surgery    Pt remains very nauseated. Retching at arrival. No voice or swallowing issues but has not taken any PO since she is so nauseated    Patient Vitals for the past 12 hrs:   Temp Pulse Resp BP SpO2   01/10/18 0602 98.3 °F (36.8 °C) 93 16 100/63 100 %   01/10/18 0130 98 °F (36.7 °C) 87 16 96/60 91 %       Date 01/09/18 0700 - 01/10/18 0659 01/10/18 0700 - 01/11/18 0659   Shift 8647-0838 3680-9153 24 Hour Total 2795-9473 3386-6183 24 Hour Total   I  N  T  A  K  E   P. O.  60 60 300  300      P. O.  60 60 300  300    I.V.  (mL/kg/hr) 2631.7  (4.4)  2631.7  (2.2)         I.V. 1600  1600         Volume (lactated Ringers infusion) 31.7  31.7         Volume (lactated Ringers infusion) 1000  1000       Shift Total  (mL/kg) 2631.7  (52.3) 60  (1.2) 2691.7  (53.5) 300  (6)  300  (6)   O  U  T  P  U  T   Urine  (mL/kg/hr) 175  (0.3) 450  (0.7) 625  (0.5)         Urine Output 100  100         Urine Output (mL) ([REMOVED] Urinary Catheter 01/09/18 [place]) 75 450 525       Emesis/NG output  25 25         Emesis  25 25         Emesis Occurrence(s)  100 x 100 x 500 x  500 x    Shift Total  (mL/kg) 175  (3.5) 475  (9.4) 650  (12.9)      NET 2456.7 -415 2041. 7 300  300   Weight (kg) 50.3 50.3 50.3 50.3 50.3 50.3     Wounds clean and intact    Neck without swelling    Normal voice    Recent Results (from the past 12 hour(s))   METABOLIC PANEL, BASIC    Collection Time: 01/10/18  4:15 AM   Result Value Ref Range    Sodium 139 136 - 145 mmol/L    Potassium 4.1 3.5 - 5.5 mmol/L    Chloride 103 100 - 108 mmol/L    CO2 24 21 - 32 mmol/L    Anion gap 12 3.0 - 18 mmol/L    Glucose 128 (H) 74 - 99 mg/dL    BUN 15 7.0 - 18 MG/DL    Creatinine 0.77 0.6 - 1.3 MG/DL    BUN/Creatinine ratio 19 12 - 20      GFR est AA >60 >60 ml/min/1.73m2    GFR est non-AA >60 >60 ml/min/1.73m2    Calcium 8.8 8.5 - 10.1 MG/DL   CBC W/O DIFF    Collection Time: 01/10/18  4:15 AM   Result Value Ref Range    WBC 14.2 (H) 4.6 - 13.2 K/uL    RBC 3.75 (L) 4.20 - 5.30 M/uL    HGB 11.9 (L) 12.0 - 16.0 g/dL    HCT 35.5 35.0 - 45.0 %    MCV 94.7 74.0 - 97.0 FL    MCH 31.7 24.0 - 34.0 PG    MCHC 33.5 31.0 - 37.0 g/dL    RDW 12.1 11.6 - 14.5 %    PLATELET 462 714 - 611 K/uL    MPV 10.5 9.2 - 11.8 FL     Imp: POD 1 left thyroid and radical NAVI/BSO    Looks good    Give Decadron and add Phenergan for breakthrough nausea    I do NOT want her to discharge with unrelieved nausea.  She does not need to be retching with neck surgery    If improves, can otherwise certainly discharge this afternoon

## 2018-01-10 NOTE — ROUTINE PROCESS
Bedside and Verbal shift change report given to Araceli Paci (oncoming nurse) by Violetta Savage RN (offgoing nurse). Report included the following information SBAR, Kardex, Intake/Output and MAR.

## 2018-01-11 VITALS
RESPIRATION RATE: 15 BRPM | WEIGHT: 111 LBS | TEMPERATURE: 98 F | SYSTOLIC BLOOD PRESSURE: 104 MMHG | BODY MASS INDEX: 17.42 KG/M2 | HEIGHT: 67 IN | DIASTOLIC BLOOD PRESSURE: 65 MMHG | HEART RATE: 75 BPM | OXYGEN SATURATION: 96 %

## 2018-01-11 PROCEDURE — 74011250637 HC RX REV CODE- 250/637: Performed by: STUDENT IN AN ORGANIZED HEALTH CARE EDUCATION/TRAINING PROGRAM

## 2018-01-11 PROCEDURE — 74011250636 HC RX REV CODE- 250/636: Performed by: SURGERY

## 2018-01-11 PROCEDURE — 74011250636 HC RX REV CODE- 250/636: Performed by: STUDENT IN AN ORGANIZED HEALTH CARE EDUCATION/TRAINING PROGRAM

## 2018-01-11 PROCEDURE — 99218 HC RM OBSERVATION: CPT

## 2018-01-11 RX ORDER — ENOXAPARIN SODIUM 100 MG/ML
30 INJECTION SUBCUTANEOUS EVERY 24 HOURS
Status: DISCONTINUED | OUTPATIENT
Start: 2018-01-12 | End: 2018-01-11 | Stop reason: HOSPADM

## 2018-01-11 RX ORDER — ENOXAPARIN SODIUM 100 MG/ML
40 INJECTION SUBCUTANEOUS EVERY 24 HOURS
Status: DISCONTINUED | OUTPATIENT
Start: 2018-01-12 | End: 2018-01-11

## 2018-01-11 RX ORDER — PROMETHAZINE HYDROCHLORIDE 25 MG/1
25 TABLET ORAL
Status: DISCONTINUED | OUTPATIENT
Start: 2018-01-11 | End: 2018-01-11 | Stop reason: HOSPADM

## 2018-01-11 RX ORDER — SCOLOPAMINE TRANSDERMAL SYSTEM 1 MG/1
1 PATCH, EXTENDED RELEASE TRANSDERMAL
Qty: 10 PATCH | Refills: 0 | Status: SHIPPED | OUTPATIENT
Start: 2018-01-13 | End: 2018-01-18 | Stop reason: ALTCHOICE

## 2018-01-11 RX ORDER — PROMETHAZINE HYDROCHLORIDE 25 MG/1
25 TABLET ORAL
Qty: 30 TAB | Refills: 1 | Status: SHIPPED | OUTPATIENT
Start: 2018-01-11 | End: 2018-01-18 | Stop reason: ALTCHOICE

## 2018-01-11 RX ADMIN — HYDROMORPHONE HYDROCHLORIDE 2 MG: 2 TABLET ORAL at 05:52

## 2018-01-11 RX ADMIN — IBUPROFEN 400 MG: 400 TABLET ORAL at 01:18

## 2018-01-11 RX ADMIN — ONDANSETRON 6 MG: 2 INJECTION INTRAMUSCULAR; INTRAVENOUS at 05:52

## 2018-01-11 RX ADMIN — PROMETHAZINE HYDROCHLORIDE 12.5 MG: 25 INJECTION INTRAMUSCULAR; INTRAVENOUS at 01:18

## 2018-01-11 RX ADMIN — ENOXAPARIN SODIUM 30 MG: 40 INJECTION SUBCUTANEOUS at 05:52

## 2018-01-11 NOTE — PROGRESS NOTES
Surgery    Pt reports nausea much improved. Now only with narcs. Visit Vitals    /79    Pulse 78    Temp 98.2 °F (36.8 °C)    Resp 16    Ht 5' 7\" (1.702 m)    Wt 50.3 kg (111 lb)    LMP 01/05/2018    SpO2 99%    BMI 17.39 kg/m2       Wound - clean and intact - minimal neck swelling    Normal voice    Imp: POD 2 left thyroidectomy - looks good    OK to discharge     Follow up with me for path and wound check next week.      OK to shower

## 2018-01-11 NOTE — PROGRESS NOTES
Problem: Abdominal Hysterectomy: Post-Op Day 1    Goal: Activity/Safety  Outcome: Progressing Towards Goal  Pt ambulatory with standby assist    Goal: Nutrition/Diet  Outcome: Progressing Towards Goal  Pt reports nausea controlled with IV Zofran and IM Phenergan. Pt denies emesis    Goal: *Passing flatus  Outcome: Not Progressing Towards Goal  Pt denies flatus    Goal: *Voiding  Outcome: Progressing Towards Goal  Pt voiding without difficulty    Problem: Falls - Risk of    Goal: *Absence of Falls  Document Berenice Fall Risk and appropriate interventions in the flowsheet.    Outcome: Progressing Towards Goal  Fall Risk Interventions:  Mobility Interventions: Patient to call before getting OOB     Medication Interventions: Patient to call before getting OOB    Elimination Interventions: Call light in reach, Patient to call for help with toileting needs, Toileting schedule/hourly rounds

## 2018-01-11 NOTE — PROGRESS NOTES
Nutrition initial assessment/Plan of care      RECOMMENDATIONS:   1. Regular Diet  2. Monitor weight and PO intake  3. RD to follow     GOALS:   1. PO intake meets >75% of protein/calorie needs by 1/15  2. Weight Maintenance/gradual gain (1-2 lbs/week) by 1/17      ASSESSMENT:   Wt status is classified as underweight per BMI of 17.4. Inadequate PO intake d/t N/V. Labs noted. Nutrition recommendations listed. RD to follow. Nutrition Diagnoses:   Inadequate PO intake related to nausea/vomiting as evidenced by pt not able to keep food/liquids down this morning. Nutrition Risk:  [] High  [x] Moderate []  Low    SUBJECTIVE/OBJECTIVE:    Pt admitted for pelvic mass s/p hysterectomy. Denies having any food allergies, problems chewingswallowing or recent wt fluctuation. Reports feeling nauseated and a couple of episodes of emesis today. PTA appetite was good and wt is stable. Pt was able to keep down some crackers and some liquids. Stated throat is /sore. Encouraged intake and provided a menu for optimal selections. Will monitor progress. Information Obtained from:    [x] Chart Review   [x] Patient   [x] Family/Caregiver   [] Nurse/Physician   [] Interdisciplinary Meeting/Rounds      Diet: Regular Diet  Medications: [x] Reviewed    Allergies: [x] Reviewed   Encounter Diagnoses     ICD-10-CM ICD-9-CM   1. S/P laparoscopic hysterectomy Z90.710 V88.01   2.  Ovarian cancer on left (HCC) C56.2 183.0     Past Medical History:   Diagnosis Date    Eczema     Endometriosis     Moderate precancerous changes of the cervix     Nausea & vomiting     severe nausea    Polycystic disease, ovaries     Unspecified adverse effect of anesthesia     post op pain, severe      Labs:  Lab Results   Component Value Date/Time    Sodium 139 01/10/2018 04:15 AM    Potassium 4.1 01/10/2018 04:15 AM    Chloride 103 01/10/2018 04:15 AM    CO2 24 01/10/2018 04:15 AM    Anion gap 12 01/10/2018 04:15 AM    Glucose 128 01/10/2018 04:15 AM    BUN 15 01/10/2018 04:15 AM    Creatinine 0.77 01/10/2018 04:15 AM    Calcium 8.8 01/10/2018 04:15 AM     Anthropometrics: BMI (calculated): 17.4  Last 3 Recorded Weights in this Encounter    01/02/18 1345 01/09/18 1259   Weight: 51.7 kg (114 lb) 50.3 kg (111 lb)    Ht Readings from Last 1 Encounters:   01/09/18 5' 7\" (1.702 m)     Patient Vitals for the past 100 hrs:   % Diet Eaten   01/10/18 2054 75 %       IBW: 135 lb %IBW: 82%   [] Weight Loss [] Weight Gain [x] Weight Stable    Estimated Nutrition Needs: [x] MSJ  [] Other:  Calories: 2057-5058 kcal Based on:   [x] Actual BW    Protein:   65-75 g Based on:   [x] Actual BW    Fluid:       6765-7878 ml Based on:   [x] Actual BW      [x] No Cultural, Uatsdin or ethnic dietary need identified.     [] Cultural, Uatsdin and ethnic food preferences identified and addressed     Wt Status:  [] Normal (18.6 - 24.9) [x] Underweight (<18.5) [] Overweight (25 - 29.9) [] Mild Obesity (30 - 34.9)  [] Moderate Obesity (35 - 39.9) [] Morbid Obesity (40+)       Nutrition Problems Identified:   [x] Suboptimal PO intake   [] Food Allergies  [] Difficulty chewing/swallowing/poor dentition  [] Constipation/Diarrhea   [x] Nausea/Vomiting   [] None  [] Other:     Plan:   [] Therapeutic Diet  []  Obtained/adjusted food preferences/tolerances and/or snacks options   []  Supplements added   [] Occupational therapy following for feeding techniques  []  HS snack added   []  Modify diet texture   []  Modify diet for food allergies   []  Educate patient   []  Assist with menu selection   [x]  Monitor PO intake on meal rounds   [x]  Continue inpatient monitoring and intervention   []  Participated in discharge planning/Interdisciplinary rounds/Team meetings   []  Other:     Education Needs:   [] Not appropriate for teaching at this time due to:   [x] Identified and addressed    Nutrition Monitoring and Evaluation:  [x] Continue ongoing monitoring and intervention  [] Other    Hermilo Promise

## 2018-01-11 NOTE — PROGRESS NOTES
[address] [facility] [place] pager: [phone]                                             Office: [phone]                                                              Fax: [phone] PROGRESS NOTE     PATIENT NAME:  Casandra Daily  MRN:                     [id number]  DATE:                    1/11/2018, 6:12 AM   ASSESSMENT  AND  PLAN   Casandra Daily is a 44 y.o. F s/p RA TALH BSO, LND,  for Pelvic mass (frozen section: low malignant potential)     1. POD#2  Overall improved condition   Encourage IS   Encourage ambulation   Voiding spontaneously   Tolerating regular diet, mild nausea but improved from previous day  Pain controlled on Dilaudid PRN  Possible discharge today PM    2. Pain   Controlled with dilaudid tablet 2 mg PRN. 3. Cardiovascular   Blood pressure /60-63 HR 86  No further issues    4. Respiratory   99% saturations on RA   No further issues    5. GI   Tolerating a Regular diet, at 75% of dinnner  Active GI function now    6. ID   Tmax98.2, at 0400   WBC 14.2    7.    Creatinine 0.7   Adequate UOP   Voiding spontaneously    8. Heme   HgB pre-op 12.4->11.9        9. Prophylaxis   VTE: SCDs and ambulation  GI: Zofran phenergan, scopalamine     10. Surgery   Pending final pathology     SUBJECTIVE   Resting comfortably with no current concerns. Her pain is well controlled with dilaudid PRN. She is tolerating a regualr diet with mild nausea. She is ambulating and voiding spontaneously. She has not passed flatus.    PHYSICAL EXAM     Vital Signs   Visit Vitals    /79    Pulse 78    Temp 98.2 °F (36.8 °C)    Resp 16    Ht 5' 7\" (1.702 m)    Wt 50.3 kg (111 lb)    LMP 01/05/2018    SpO2 99%    BMI 17.39 kg/m2         GENERAL:       NAD, AAOx3   CV:                    RRR, nml S1S2   PULMONARY: CTAB   ABDOMEN:      Soft, ND, NT, minimal BS,   EXT:                  No c/c/e     LABORATORY CBC  Recent Labs      01/10/18   0415   WBC  14.2*   HCT  35.5       Basic Metabolic Profile   Recent Labs      01/10/18   0415   NA  139   CO2  24   BUN  15       Marcella Valenzuela MD [id number]  1/11/2018,  6:12 AM  Gyn ONC Pager: [phone]    Please page oncology pager with questions  During the day, between 0530 AM and 1730 PM please page [phone]  Overnight, between 441 0134 PM and 0530AM please page [phone]    If no response, please page Dr. Harley New at [phone]  If still no response, please page Dr. Sheri Pool at [phone]

## 2018-01-11 NOTE — PROGRESS NOTES
2052  Pt medicated for mild nausea. Denies emesis and tolerated regular diet. 0118  Pt medicated for mild nausea. Denies emesis. 5351   Pt medicated for mild nausea. Denies emesis. Bedside and Verbal shift change report given to Angy Talavera RN by Andres Burns RN. Report included the following information SBAR, Kardex, Intake/Output and MAR.

## 2018-01-12 ENCOUNTER — TELEPHONE (OUTPATIENT)
Dept: SURGERY | Age: 38
End: 2018-01-12

## 2018-01-18 ENCOUNTER — OFFICE VISIT (OUTPATIENT)
Dept: SURGERY | Age: 38
End: 2018-01-18

## 2018-01-18 VITALS
DIASTOLIC BLOOD PRESSURE: 75 MMHG | WEIGHT: 111 LBS | BODY MASS INDEX: 17.42 KG/M2 | RESPIRATION RATE: 16 BRPM | SYSTOLIC BLOOD PRESSURE: 109 MMHG | HEIGHT: 67 IN | OXYGEN SATURATION: 100 % | HEART RATE: 70 BPM | TEMPERATURE: 95.4 F

## 2018-01-18 DIAGNOSIS — Z09 POSTOPERATIVE EXAMINATION: Primary | ICD-10-CM

## 2018-01-18 NOTE — COMMUNICATION BODY
SUBJECTIVE: Darcy Parker is a 44 y.o. female is seen for a routine postop check 2 weeks after left thyroid lobectomy in conjunction with NAVI/BSO for T2b ovarian cancer. The thyroid was benign nodular hyperplasia. Reports no problems with the wound or other issues. Activity, diet and bowels are normal. No pain. OBJECTIVE: Appears well. Wound is well healed without complications or infection. ASSESSMENT: normal postoperative course, doing well. PLAN: Suture tag removed. Return PRN.

## 2018-01-18 NOTE — PROGRESS NOTES
1. Have you been to the ER, urgent care clinic since your last visit? Hospitalized since your last visit? No    2. Have you seen or consulted any other health care providers outside of the [address] [place] [address] since your last visit? Include any pap smears or colon screening.  No

## 2018-01-18 NOTE — MR AVS SNAPSHOT
Tosha Rodrigues 
 
 
 [address] [address] [address] 
[phone] Patient: Bernardino Carr MRN: [id number] :1980 Visit Information Date & Time Provider Department Dept. Phone Encounter #  
 2018  2:45 PM MD [facility] [phone] [id number] Upcoming Health Maintenance Date Due Pneumococcal 19-64 Highest Risk (1 of 3 - PCV13) 1998 DTaP/Tdap/Td series (1 - Tdap) 2000 PAP AKA CERVICAL CYTOLOGY 2000 Allergies as of 2018  Review Complete On: 2018 By: Juliann Fang LPN Severity Noted Reaction Type Reactions Latex  2013   Topical Contact Dermatitis Irritates skin Doryx [Doxycycline Hyclate]  2018    Hives Sulfa (Sulfonamide Antibiotics)  2016    Hives Current Immunizations  Never Reviewed Name Date Influenza Vaccine 2017 Not reviewed this visit You Were Diagnosed With   
  
 Codes Comments Postoperative examination    -  Primary ICD-10-CM: E24 ICD-9-CM: V67.00 Vitals BP Pulse Temp Resp Height(growth percentile) Weight(growth percentile) 109/75 70 95.4 °F (35.2 °C) (Oral) 16 5' 7\" (1.702 m) 111 lb (50.3 kg) LMP SpO2 BMI OB Status Smoking Status 2018 100% 17.39 kg/m2 Having regular periods Never Smoker Vitals History BMI and BSA Data Body Mass Index Body Surface Area  
 17.39 kg/m 2 1.54 m 2 Your Updated Medication List  
  
   
This list is accurate as of: 18  3:10 PM.  Always use your most recent med list.  
  
  
  
  
 estradiol 0.1 mg/24 hr  
Commonly known as:  VIVELLE-DOT  
1 Patch by TransDERmal route two (2) days a week. Introducing [facility]! Dear Lamberto Thornton: Thank you for requesting a [employer] account.   Our records indicate that you already have an active [employer] account. You can access your account anytime at [email or web address] Did you know that you can access your hospital and ER discharge instructions at any time in [employer]? You can also review all of your test results from your hospital stay or ER visit. Additional Information If you have questions, please visit the Frequently Asked Questions section of the [employer] website at [email or web address] Remember, [employer] is NOT to be used for urgent needs. For medical emergencies, dial 911. Now available from your iPhone and Android! Please provide this summary of care documentation to your next provider. Your primary care clinician is listed as Gerardo Ramsey. If you have any questions after today's visit, please call [phone].

## 2018-01-18 NOTE — PROGRESS NOTES
SUBJECTIVE: Tania Navarrete is a 44 y.o. female is seen for a routine postop check 2 weeks after left thyroid lobectomy in conjunction with NAVI/BSO for T2b ovarian cancer. The thyroid was benign nodular hyperplasia. Reports no problems with the wound or other issues. Activity, diet and bowels are normal. No pain. OBJECTIVE: Appears well. Wound is well healed without complications or infection. ASSESSMENT: normal postoperative course, doing well. PLAN: Suture tag removed. Return PRN.

## 2018-01-18 NOTE — LETTER
1/18/2018 3:09 PM 
 
Patient:  Cristina Ramos YOB: 1980 Date of Visit: 1/18/2018 Cyndi Parish MD 
[address] [facility] [address] [facility] [address] VIA In Basket Gaye Hunter MD 
[address] [address] VIA Facsimile: [phone] Michael Orr MD 
[facility] [address] [address] VIA Facsimile: [phone] Dear MD Gaye Lanier MD Lillis Harvard, MD, [facility] you [facility] [address] [facility]. Below are the relevant portions of my assessment and plan of care. SUBJECTIVE: Cristina Ramos is a 44 y.o. female is seen for a routine postop check 2 weeks after left thyroid lobectomy in conjunction with NAVI/BSO for T2b ovarian cancer. The thyroid was benign nodular hyperplasia. Reports no problems with the wound or other issues. Activity, diet and bowels are normal. No pain. OBJECTIVE: Appears well. Wound is well healed without complications or infection. ASSESSMENT: normal postoperative course, doing well. PLAN: Suture tag removed. Return PRN. Thank you very much for your referral of Ms. Cristina Ramos. If you have questions, please do not hesitate to call me. I look forward to following Ms. Figueredo along with you and will keep you updated as to her progress.   
 
 
 
 
Sincerely, 
 
 
Angel Gates MD

## 2018-02-06 NOTE — ANCILLARY DISCHARGE INSTRUCTIONS
[facility]  Discharge Phone Call       After-Care Discharge Phone Call Questions: no answer     Were you able to get your prescriptions filled? Comment:      [] Yes  []No    Comment if answer is \"No\"   Are you taking your medication(s) as your doctor ordered? Do you understand the purpose of your medications? Comment:    [] Yes  []No    Comment if answer is \"No\"   Are you taking any other medications that are not on the list?  Comment:      [] Yes  []No    Comment if answer is \"Yes\"   Do you have any questions about your medications? Are you aware of potential side effects? Comment:    [] Yes  []No    Comment if answer is \"Yes\"   Did you make your follow-up appointments (if the hospital did not do this before  discharge)? Comment:    [] Yes  []No    Comment if answer is \"No\"   Is there any reason you might not be able to keep your follow-up appointments? Comment:     [] Yes  []No    Comment if answer is \"Yes\"   Do you have any questions about your care plan? Are you aware of what health problems to be alert for? Comment:    [] Yes  []No    Comment if answer is \"Yes\"   Do you have a good understanding of how you should manage your health? Comment:    [] Yes  []No    Comment if answer is \"Yes\"   Do you know which symptoms to watch for that would mean you would need to call your doctor right away? Comment:      [] Yes  []No    Comment if answer is \"No\"   Do you have any questions about the follow up process or any instructions that we have provided? Comment:    [] Yes  []No    Comment if answer is \"Yes\"   Did staff take your preferences into account?         [] Yes  []No    Comment if answer is \"Yes\"

## 2022-03-18 PROBLEM — Z90.710 S/P LAPAROSCOPIC HYSTERECTOMY: Status: ACTIVE | Noted: 2018-01-09

## 2022-03-19 PROBLEM — C56.2 OVARIAN CANCER ON LEFT (HCC): Status: ACTIVE | Noted: 2018-01-09

## 2024-09-05 NOTE — DISCHARGE INSTRUCTIONS
DISCHARGE SUMMARY from Nurse    PATIENT INSTRUCTIONS:    After general anesthesia or intravenous sedation, for 24 hours or while taking prescription Narcotics:  · Limit your activities  · Do not drive and operate hazardous machinery  · Do not make important personal or business decisions  · Do  not drink alcoholic beverages  · If you have not urinated within 8 hours after discharge, please contact your surgeon on call. Report the following to your surgeon:  · Excessive pain, swelling, redness or odor of or around the surgical area  · Temperature over 100.5  · Nausea and vomiting lasting longer than 4 hours or if unable to take medications  · Any signs of decreased circulation or nerve impairment to extremity: change in color, persistent  numbness, tingling, coldness or increase pain  · Any questions    What to do at Home:  Recommended activity: No lifting, Driving, or Strenuous exercise until cleared by surgeon. May shower. If you experience any of the following symptoms severe pain, nausea and vomiting, fever above 100.5, bleeding or drainage from incisions, shortness of breath, please follow up with you surgeon. *  Please give a list of your current medications to your Primary Care Provider. *  Please update this list whenever your medications are discontinued, doses are      changed, or new medications (including over-the-counter products) are added. *  Please carry medication information at all times in case of emergency situations. These are general instructions for a healthy lifestyle:    No smoking/ No tobacco products/ Avoid exposure to second hand smoke  Surgeon General's Warning:  Quitting smoking now greatly reduces serious risk to your health.     Obesity, smoking, and sedentary lifestyle greatly increases your risk for illness    A healthy diet, regular physical exercise & weight monitoring are important for maintaining a healthy lifestyle    You may be retaining fluid if you have a history of heart failure or if you experience any of the following symptoms:  Weight gain of 3 pounds or more overnight or 5 pounds in a week, increased swelling in our hands or feet or shortness of breath while lying flat in bed. Please call your doctor as soon as you notice any of these symptoms; do not wait until your next office visit. Recognize signs and symptoms of STROKE:    F-face looks uneven    A-arms unable to move or move unevenly    S-speech slurred or non-existent    T-time-call 911 as soon as signs and symptoms begin-DO NOT go       Back to bed or wait to see if you get better-TIME IS BRAIN. Warning Signs of HEART ATTACK     Call 911 if you have these symptoms:   Chest discomfort. Most heart attacks involve discomfort in the center of the chest that lasts more than a few minutes, or that goes away and comes back. It can feel like uncomfortable pressure, squeezing, fullness, or pain.  Discomfort in other areas of the upper body. Symptoms can include pain or discomfort in one or both arms, the back, neck, jaw, or stomach.  Shortness of breath with or without chest discomfort.  Other signs may include breaking out in a cold sweat, nausea, or lightheadedness. Don't wait more than five minutes to call 911 - MINUTES MATTER! Fast action can save your life. Calling 911 is almost always the fastest way to get lifesaving treatment. Emergency Medical Services staff can begin treatment when they arrive -- up to an hour sooner than if someone gets to the hospital by car. The discharge information has been reviewed with the patient. The patient verbalized understanding. Discharge medications reviewed with the patient and appropriate educational materials and side effects teaching were provided. Patient armband removed and shredded.   ___________________________________________________________________________________________________________________________________ No

## 2024-09-23 ENCOUNTER — HOSPITAL ENCOUNTER (OUTPATIENT)
Facility: HOSPITAL | Age: 44
Setting detail: SPECIMEN
Discharge: HOME | End: 2024-09-26
Payer: COMMERCIAL

## 2024-09-23 ENCOUNTER — OFFICE VISIT (OUTPATIENT)
Facility: CLINIC | Age: 44
End: 2024-09-23

## 2024-09-23 VITALS
RESPIRATION RATE: 15 BRPM | HEART RATE: 64 BPM | WEIGHT: 126.4 LBS | HEIGHT: 67 IN | DIASTOLIC BLOOD PRESSURE: 76 MMHG | TEMPERATURE: 97.9 F | BODY MASS INDEX: 19.84 KG/M2 | OXYGEN SATURATION: 98 % | SYSTOLIC BLOOD PRESSURE: 121 MMHG

## 2024-09-23 DIAGNOSIS — Z13.228 SCREENING FOR METABOLIC DISORDER: ICD-10-CM

## 2024-09-23 DIAGNOSIS — Z80.3 FAMILY HISTORY OF BREAST CANCER IN MOTHER: ICD-10-CM

## 2024-09-23 DIAGNOSIS — Z13.0 SCREENING FOR DEFICIENCY ANEMIA: ICD-10-CM

## 2024-09-23 DIAGNOSIS — Z13.1 SCREENING FOR DIABETES MELLITUS (DM): ICD-10-CM

## 2024-09-23 DIAGNOSIS — Z13.29 SCREENING FOR THYROID DISORDER: ICD-10-CM

## 2024-09-23 DIAGNOSIS — Z13.220 SCREENING FOR CHOLESTEROL LEVEL: ICD-10-CM

## 2024-09-23 DIAGNOSIS — Z13.89 SCREENING FOR BLOOD OR PROTEIN IN URINE: ICD-10-CM

## 2024-09-23 DIAGNOSIS — Z23 IMMUNIZATION DUE: Primary | ICD-10-CM

## 2024-09-23 DIAGNOSIS — Z12.31 ENCOUNTER FOR SCREENING MAMMOGRAM FOR MALIGNANT NEOPLASM OF BREAST: ICD-10-CM

## 2024-09-23 DIAGNOSIS — B37.0 ORAL CANDIDA: ICD-10-CM

## 2024-09-23 DIAGNOSIS — Q89.01 SPLEEN ABSENT: ICD-10-CM

## 2024-09-23 DIAGNOSIS — Z85.43 HISTORY OF OVARIAN CANCER: ICD-10-CM

## 2024-09-23 DIAGNOSIS — Z12.11 SCREEN FOR COLON CANCER: ICD-10-CM

## 2024-09-23 LAB
ALBUMIN SERPL-MCNC: 3.7 G/DL (ref 3.4–5)
ALBUMIN/GLOB SERPL: 1.2 (ref 0.8–1.7)
ALP SERPL-CCNC: 65 U/L (ref 45–117)
ALT SERPL-CCNC: 16 U/L (ref 13–56)
ANION GAP SERPL CALC-SCNC: 3 MMOL/L (ref 3–18)
APPEARANCE UR: CLEAR
AST SERPL-CCNC: 14 U/L (ref 10–38)
BASOPHILS # BLD: 0.1 K/UL (ref 0–0.1)
BASOPHILS NFR BLD: 1 % (ref 0–2)
BILIRUB SERPL-MCNC: 0.6 MG/DL (ref 0.2–1)
BILIRUB UR QL: NEGATIVE
BUN SERPL-MCNC: 6 MG/DL (ref 7–18)
BUN/CREAT SERPL: 7 (ref 12–20)
CALCIUM SERPL-MCNC: 9.9 MG/DL (ref 8.5–10.1)
CHLORIDE SERPL-SCNC: 106 MMOL/L (ref 100–111)
CHOLEST SERPL-MCNC: 236 MG/DL
CO2 SERPL-SCNC: 32 MMOL/L (ref 21–32)
COLOR UR: YELLOW
CREAT SERPL-MCNC: 0.82 MG/DL (ref 0.6–1.3)
DIFFERENTIAL METHOD BLD: ABNORMAL
EOSINOPHIL # BLD: 0.1 K/UL (ref 0–0.4)
EOSINOPHIL NFR BLD: 1 % (ref 0–5)
EPITH CASTS URNS QL MICRO: ABNORMAL /LPF (ref 0–5)
ERYTHROCYTE [DISTWIDTH] IN BLOOD BY AUTOMATED COUNT: 13.2 % (ref 11.6–14.5)
GLOBULIN SER CALC-MCNC: 3.1 G/DL (ref 2–4)
GLUCOSE SERPL-MCNC: 85 MG/DL (ref 74–99)
GLUCOSE UR STRIP.AUTO-MCNC: NEGATIVE MG/DL
HCT VFR BLD AUTO: 41.9 % (ref 35–45)
HDLC SERPL-MCNC: 69 MG/DL (ref 40–60)
HDLC SERPL: 3.4 (ref 0–5)
HGB BLD-MCNC: 14 G/DL (ref 12–16)
HGB UR QL STRIP: NEGATIVE
HYALINE CASTS URNS QL MICRO: ABNORMAL /LPF (ref 0–2)
IMM GRANULOCYTES # BLD AUTO: 0 K/UL (ref 0–0.04)
IMM GRANULOCYTES NFR BLD AUTO: 0 % (ref 0–0.5)
KETONES UR QL STRIP.AUTO: NEGATIVE MG/DL
LDLC SERPL CALC-MCNC: 142.2 MG/DL (ref 0–100)
LEUKOCYTE ESTERASE UR QL STRIP.AUTO: NEGATIVE
LIPID PANEL: ABNORMAL
LYMPHOCYTES # BLD: 2.3 K/UL (ref 0.9–3.6)
LYMPHOCYTES NFR BLD: 38 % (ref 21–52)
MCH RBC QN AUTO: 33.5 PG (ref 24–34)
MCHC RBC AUTO-ENTMCNC: 33.4 G/DL (ref 31–37)
MCV RBC AUTO: 100.2 FL (ref 78–100)
MONOCYTES # BLD: 0.8 K/UL (ref 0.05–1.2)
MONOCYTES NFR BLD: 13 % (ref 3–10)
NEUTS SEG # BLD: 2.8 K/UL (ref 1.8–8)
NEUTS SEG NFR BLD: 48 % (ref 40–73)
NITRITE UR QL STRIP.AUTO: NEGATIVE
NRBC # BLD: 0 K/UL (ref 0–0.01)
NRBC BLD-RTO: 0 PER 100 WBC
PH UR STRIP: 6.5 (ref 5–8)
PLATELET # BLD AUTO: 337 K/UL (ref 135–420)
PMV BLD AUTO: 11.3 FL (ref 9.2–11.8)
POTASSIUM SERPL-SCNC: 3.9 MMOL/L (ref 3.5–5.5)
PROT SERPL-MCNC: 6.8 G/DL (ref 6.4–8.2)
PROT UR STRIP-MCNC: 100 MG/DL
RBC # BLD AUTO: 4.18 M/UL (ref 4.2–5.3)
RBC #/AREA URNS HPF: ABNORMAL /HPF (ref 0–5)
SODIUM SERPL-SCNC: 141 MMOL/L (ref 136–145)
SP GR UR REFRACTOMETRY: 1.02 (ref 1–1.03)
T4 FREE SERPL-MCNC: 0.9 NG/DL (ref 0.7–1.5)
TRIGL SERPL-MCNC: 124 MG/DL
TSH SERPL DL<=0.05 MIU/L-ACNC: 1.71 UIU/ML (ref 0.36–3.74)
UROBILINOGEN UR QL STRIP.AUTO: 1 EU/DL (ref 0.2–1)
VLDLC SERPL CALC-MCNC: 24.8 MG/DL
WBC # BLD AUTO: 5.9 K/UL (ref 4.6–13.2)
WBC URNS QL MICRO: ABNORMAL /HPF (ref 0–4)

## 2024-09-23 PROCEDURE — 81001 URINALYSIS AUTO W/SCOPE: CPT

## 2024-09-23 PROCEDURE — 36415 COLL VENOUS BLD VENIPUNCTURE: CPT

## 2024-09-23 PROCEDURE — 85025 COMPLETE CBC W/AUTO DIFF WBC: CPT

## 2024-09-23 PROCEDURE — 80061 LIPID PANEL: CPT

## 2024-09-23 PROCEDURE — 80053 COMPREHEN METABOLIC PANEL: CPT

## 2024-09-23 PROCEDURE — 84439 ASSAY OF FREE THYROXINE: CPT

## 2024-09-23 PROCEDURE — 84443 ASSAY THYROID STIM HORMONE: CPT

## 2024-09-23 RX ORDER — FLUCONAZOLE 100 MG/1
TABLET ORAL
Qty: 5 TABLET | Refills: 3 | Status: SHIPPED | OUTPATIENT
Start: 2024-09-23 | End: 2024-09-27 | Stop reason: SDUPTHER

## 2024-09-23 RX ORDER — FLUCONAZOLE 100 MG/1
TABLET ORAL
COMMUNITY
Start: 2024-08-31 | End: 2024-09-23 | Stop reason: SDUPTHER

## 2024-09-23 SDOH — ECONOMIC STABILITY: FOOD INSECURITY: WITHIN THE PAST 12 MONTHS, THE FOOD YOU BOUGHT JUST DIDN'T LAST AND YOU DIDN'T HAVE MONEY TO GET MORE.: NEVER TRUE

## 2024-09-23 SDOH — ECONOMIC STABILITY: FOOD INSECURITY: WITHIN THE PAST 12 MONTHS, YOU WORRIED THAT YOUR FOOD WOULD RUN OUT BEFORE YOU GOT MONEY TO BUY MORE.: NEVER TRUE

## 2024-09-23 SDOH — HEALTH STABILITY: PHYSICAL HEALTH: ON AVERAGE, HOW MANY MINUTES DO YOU ENGAGE IN EXERCISE AT THIS LEVEL?: 40 MIN

## 2024-09-23 SDOH — HEALTH STABILITY: PHYSICAL HEALTH: ON AVERAGE, HOW MANY DAYS PER WEEK DO YOU ENGAGE IN MODERATE TO STRENUOUS EXERCISE (LIKE A BRISK WALK)?: 1 DAY

## 2024-09-23 SDOH — ECONOMIC STABILITY: INCOME INSECURITY: HOW HARD IS IT FOR YOU TO PAY FOR THE VERY BASICS LIKE FOOD, HOUSING, MEDICAL CARE, AND HEATING?: NOT HARD AT ALL

## 2024-09-23 ASSESSMENT — PATIENT HEALTH QUESTIONNAIRE - PHQ9
SUM OF ALL RESPONSES TO PHQ QUESTIONS 1-9: 0
SUM OF ALL RESPONSES TO PHQ9 QUESTIONS 1 & 2: 0
SUM OF ALL RESPONSES TO PHQ QUESTIONS 1-9: 0
1. LITTLE INTEREST OR PLEASURE IN DOING THINGS: NOT AT ALL
SUM OF ALL RESPONSES TO PHQ QUESTIONS 1-9: 0
2. FEELING DOWN, DEPRESSED OR HOPELESS: NOT AT ALL
SUM OF ALL RESPONSES TO PHQ QUESTIONS 1-9: 0

## 2024-09-24 ENCOUNTER — PATIENT MESSAGE (OUTPATIENT)
Facility: CLINIC | Age: 44
End: 2024-09-24

## 2024-09-27 DIAGNOSIS — B37.0 ORAL CANDIDA: ICD-10-CM

## 2024-09-27 RX ORDER — FLUCONAZOLE 100 MG/1
TABLET ORAL
Qty: 5 TABLET | Refills: 3 | Status: SHIPPED | OUTPATIENT
Start: 2024-09-27

## (undated) DEVICE — CLIP INT SM WIDE RED TI TRNSVRS GRV CHEVRON SHP W PRECIS

## (undated) DEVICE — SUTURE MCRYL SZ 4-0 L18IN ABSRB UD L19MM PS-2 3/8 CIR PRIM Y496G

## (undated) DEVICE — SOL IRR NACL 0.9% 500ML POUR --

## (undated) DEVICE — SUTURE VCRL SZ 3-0 L18IN ABSRB UD L26MM SH 1/2 CIR J864D

## (undated) DEVICE — Device

## (undated) DEVICE — REM POLYHESIVE ADULT PATIENT RETURN ELECTRODE: Brand: VALLEYLAB

## (undated) DEVICE — PROBE 8225825 3PK INCREMT STD PRASS ROHS

## (undated) DEVICE — VISUALIZATION SYSTEM: Brand: CLEARIFY

## (undated) DEVICE — SUTURE VCRL SZ 0 L18IN ABSRB UD POLYGLACTIN 910 BRAID TIE J912G

## (undated) DEVICE — ARM DRAPE

## (undated) DEVICE — SUTURE NONABSORBABLE SILK BRAID BLK PSL 2 0 30IN 486H

## (undated) DEVICE — TIP COVER ACCESSORY

## (undated) DEVICE — TELFA NON-ADHERENT ABSORBENT DRESSING: Brand: TELFA

## (undated) DEVICE — SEAL UNIV 5-8MM DISP BX/10 -- DA VINCI XI - SNGL USE

## (undated) DEVICE — ZINACTIVE USE 2641837 CLIP LIG M BLU TI HRT SHP WIRE HORZ 600 PER BX

## (undated) DEVICE — COLUMN DRAPE

## (undated) DEVICE — GLOVE SURG SZ 7.5 L11.73IN FNGR THK7.5MIL STRW LTX POLYMER

## (undated) DEVICE — TELFA NON-ADHERENT PADS PREPAK: Brand: TELFA

## (undated) DEVICE — DRAPE,UTILITY,TAPE,15X26,STERILE: Brand: MEDLINE

## (undated) DEVICE — COVER LT HNDL BLU PLAS

## (undated) DEVICE — LIGHT HANDLE: Brand: DEVON

## (undated) DEVICE — SUTURE PERMAHAND SZ 3-0 L18IN NONABSORBABLE BLK SILK BRAID A184H

## (undated) DEVICE — STERILE POLYISOPRENE POWDER-FREE SURGICAL GLOVES: Brand: PROTEXIS

## (undated) DEVICE — SOL INJ SOD CL 0.9% 1000ML BG --

## (undated) DEVICE — SUTURE VCRL SZ 3-0 L27IN ABSRB UD L26MM SH 1/2 CIR J416H

## (undated) DEVICE — DERMABOND SKIN ADH 0.7ML -- DERMABOND ADVANCED 12/BX

## (undated) DEVICE — PAD BD CONVOLUTED FOAM

## (undated) DEVICE — BLADELESS OBTURATOR

## (undated) DEVICE — ELECTRO LUBE IS A SINGLE PATIENT USE DEVICE THAT IS INTENDED TO BE USED ON ELECTROSURGICAL ELECTRODES TO REDUCE STICKING.: Brand: KEY SURGICAL ELECTRO LUBE

## (undated) DEVICE — KENDALL SCD EXPRESS SLEEVES, KNEE LENGTH, MEDIUM: Brand: KENDALL SCD

## (undated) DEVICE — DRAPE SHEET, X-LARGE: Brand: CONVERTORS

## (undated) DEVICE — SUTURE PERMAHAND SZ 3-0 L18IN NONABSORBABLE BLK L26MM SH C013D

## (undated) DEVICE — SUT SLK 4-0 18IN TIE MP BLK --

## (undated) DEVICE — DISPOSABLE SUCTION/IRRIGATOR TUBE SET WITH TIP: Brand: AHTO

## (undated) DEVICE — (D)SHEARS HARM FOCS 9MM -- DUPE USE ITEM 322125

## (undated) DEVICE — INSULATED BLADE ELECTRODE: Brand: EDGE

## (undated) DEVICE — DECANTER VI C-FLO LF --

## (undated) DEVICE — SUTURE MCRYL SZ 4-0 L27IN ABSRB UD L24MM PS-1 3/8 CIR PRIM Y935H

## (undated) DEVICE — TIBURON SPLIT SHEET: Brand: CONVERTORS

## (undated) DEVICE — INTENDED FOR TISSUE SEPARATION, AND OTHER PROCEDURES THAT REQUIRE A SHARP SURGICAL BLADE TO PUNCTURE OR CUT.: Brand: BARD-PARKER ® CARBON RIB-BACK BLADES

## (undated) DEVICE — SOLUTION SCRB 4OZ 4% CHG CLN BASE FOR PT SKIN ANTISEPSIS

## (undated) DEVICE — TRAY CATH SIL 16FR 10 CA STATLOK

## (undated) DEVICE — ROCKER SWITCH PENCIL HOLSTER: Brand: VALLEYLAB

## (undated) DEVICE — SYRINGE COR CTRL C10ML SLD PLUNG FIX M

## (undated) DEVICE — PREP SKN CHLRAPRP 26ML TNT -- CONVERT TO ITEM 373320